# Patient Record
Sex: FEMALE | Race: WHITE | Employment: STUDENT | URBAN - METROPOLITAN AREA
[De-identification: names, ages, dates, MRNs, and addresses within clinical notes are randomized per-mention and may not be internally consistent; named-entity substitution may affect disease eponyms.]

---
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
## 2021-03-03 DIAGNOSIS — R42 DIZZINESS: ICD-10-CM

## 2021-03-03 DIAGNOSIS — R42 DIZZINESS: Primary | ICD-10-CM

## 2021-03-03 LAB
BASOPHILS # BLD AUTO: 0 10E9/L (ref 0–0.2)
BASOPHILS NFR BLD AUTO: 0.7 %
DIFFERENTIAL METHOD BLD: NORMAL
EOSINOPHIL # BLD AUTO: 0.1 10E9/L (ref 0–0.7)
EOSINOPHIL NFR BLD AUTO: 1.8 %
ERYTHROCYTE [DISTWIDTH] IN BLOOD BY AUTOMATED COUNT: 12.9 % (ref 10–15)
FERRITIN SERPL-MCNC: 8 NG/ML (ref 12–150)
HCT VFR BLD AUTO: 41.1 % (ref 35–47)
HGB BLD-MCNC: 13.3 G/DL (ref 11.7–15.7)
IMM GRANULOCYTES # BLD: 0 10E9/L (ref 0–0.4)
IMM GRANULOCYTES NFR BLD: 0.4 %
LYMPHOCYTES # BLD AUTO: 2.8 10E9/L (ref 0.8–5.3)
LYMPHOCYTES NFR BLD AUTO: 49.9 %
MCH RBC QN AUTO: 30.9 PG (ref 26.5–33)
MCHC RBC AUTO-ENTMCNC: 32.4 G/DL (ref 31.5–36.5)
MCV RBC AUTO: 96 FL (ref 78–100)
MONOCYTES # BLD AUTO: 0.4 10E9/L (ref 0–1.3)
MONOCYTES NFR BLD AUTO: 7.2 %
NEUTROPHILS # BLD AUTO: 2.3 10E9/L (ref 1.6–8.3)
NEUTROPHILS NFR BLD AUTO: 40 %
NRBC # BLD AUTO: 0 10*3/UL
NRBC BLD AUTO-RTO: 0 /100
PLATELET # BLD AUTO: 257 10E9/L (ref 150–450)
RBC # BLD AUTO: 4.3 10E12/L (ref 3.8–5.2)
WBC # BLD AUTO: 5.7 10E9/L (ref 4–11)

## 2021-03-03 PROCEDURE — 36415 COLL VENOUS BLD VENIPUNCTURE: CPT | Performed by: PATHOLOGY

## 2021-03-03 PROCEDURE — 82728 ASSAY OF FERRITIN: CPT | Performed by: PATHOLOGY

## 2021-03-03 PROCEDURE — 85025 COMPLETE CBC W/AUTO DIFF WBC: CPT | Performed by: PATHOLOGY

## 2021-03-04 ENCOUNTER — OFFICE VISIT (OUTPATIENT)
Dept: FAMILY MEDICINE | Facility: CLINIC | Age: 21
End: 2021-03-04
Payer: COMMERCIAL

## 2021-03-04 VITALS — HEART RATE: 67 BPM | DIASTOLIC BLOOD PRESSURE: 79 MMHG | SYSTOLIC BLOOD PRESSURE: 114 MMHG

## 2021-03-04 DIAGNOSIS — R55 PRE-SYNCOPE: Primary | ICD-10-CM

## 2021-03-04 NOTE — LETTER
"  3/4/2021      RE: Leland Gonzáles  [address] [facility] [address]       [facility]            SUBJECTIVE:     Leland Gonzáles is a 20 year old female  presenting to clinic today with concerns for dizziness. I was alerted by the headt swimming ATC, Dyan Stout of the patients concerns yesterday. Historically, th patient and her family have thought that she has low iron levels and that could be contributing. We ordered a cbc with platelets prior to the exam, and this showed her ferritin levels to be 8 (normal >12). She was not enmic however and the results are be,ow.     Today, Leland states that she has been feeling like this for about a year. No family history of this in others. Feels as though the \"dizzy\" episodes have been increasing in frequency over the past 3 months. \"Dizzy\" described as feeling as though she wants to faint, but she has not lost consciousness. Happened twice over the past week (once while brushing teeth and the other while at practice, about to enter pool). She eats adequate calories and adequate fluid volume (4493-9363 calories and > 3L of water). She does admit to palpitations at times. She also feels tired and weak. Mild stomach pains for the past year as well.    No headache, n/v, fevers, runny/stuffy nose, sore throat, chest pain, shortness of breath, numbness/tingling, or changes in bowel habits. No skin changes, hair loss, or abnormal weight gain/loss.     PMH, Medications and Allergies were reviewed and updated as needed.    ROS:  As noted above otherwise negative.    There is no problem list on file for this patient.      Current Outpatient Medications   Medication Sig Dispense Refill     desogestrel-ethinyl estradiol (APRI) 0.15-30 MG-MCG tablet Take 1 tablet by mouth daily                OBJECTIVE:     Vitals:   Vitals:    03/04/21 1002 03/04/21 1003 03/04/21 1007   BP: 124/65 111/63 114/79   Pulse: 78 67      BMI: There is " no height or weight on file to calculate BMI.    Gen:  Well nourished and in no acute distress  HEENT: Extraocular movement intact.  Neck: supple  CV: RRR. No murmurs, rubs, or gallops  Resp: Lungs CTA. NO evidence of consolidation, wheezing, rales, or crackles.   Skin: No rash, erythema, ecchymosis or obvious abnormality  Psych: Euthymic   Neuro: Alert and oriented.              ASSESSMENT & PLAN:      Leland was seen today for fatigue.    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 She had covid in September of 2020 so a cardiac workup at that time was wnl. She does not seem to be having orthostatic hypotension as this was checked today and within normal limits for blood pressure and heart rate. Possibility that this could be vasovagal but there is no clear nidus from her history. Further workup is recommended at this point. I do not think she needs a stress ECHO as her symptoms appear to be happening at rest and her previous ECHO from 2020 was unremarkable. Her recent ferritin was low at 8, but she did just finish her menstrual cycle yesterday (same time as the lab) and her hgb was within normal limits. There is a potential that iron deficiency without anemia could be a cause of her fatigue but may not explain all of her symptoms.     -Zio patch to assess arrhythmia  -Iron studies. May need to supplement iron depending on further workup  -vitamin D level. May need to supplement pending level. Could explain tiredness during this particular season  -CMP and TSH  -If overall workup negative, should consider tilt table testing as a next step  -Should not ensue in intense activity or swimming until testing has  resulted and further discussion with us in regards to direction of management  -Her competition season is effectively finished so this should not be an issue    JC De Jesus in attendance  Swim JC Stout updated over the phone      Options for treatment and/or follow-up care were reviewed with the patient. Patient was actively involved in the decision making process. Patient verbalized understanding and was in agreement with the plan.    The patient was seen by and discussed with Dr.Suzanne TAVON Fong MD, CAQ, CCD    Rell Smith DO  Primary Care Sports Medicine Fellow      Attending Note:   I have discussed this patient and have reviewed the clinical presentation and progress note with the fellow. I agree with the treatment plan as outlined. The plan was formulated with the fellow on the day of the patient's visit.     Latonya Fong MD, CAQ, CCD  [facility]  Sports Medicine and Bone Health      Rell Smith DO

## 2021-03-04 NOTE — PROGRESS NOTES
"[facility]            SUBJECTIVE:     Leland Gonzáles is a 20 year old female  presenting to clinic today with concerns for dizziness. I was alerted by the headt swimming ATC, Dyan Stout of the patients concerns yesterday. Historically, th patient and her family have thought that she has low iron levels and that could be contributing. We ordered a cbc with platelets prior to the exam, and this showed her ferritin levels to be 8 (normal >12). She was not enmic however and the results are be,ow.     Today, Leland states that she has been feeling like this for about a year. No family history of this in others. Feels as though the \"dizzy\" episodes have been increasing in frequency over the past 3 months. \"Dizzy\" described as feeling as though she wants to faint, but she has not lost consciousness. Happened twice over the past week (once while brushing teeth and the other while at practice, about to enter pool). She eats adequate calories and adequate fluid volume (3601-4643 calories and > 3L of water). She does admit to palpitations at times. She also feels tired and weak. Mild stomach pains for the past year as well.    No headache, n/v, fevers, runny/stuffy nose, sore throat, chest pain, shortness of breath, numbness/tingling, or changes in bowel habits. No skin changes, hair loss, or abnormal weight gain/loss.     PMH, Medications and Allergies were reviewed and updated as needed.    ROS:  As noted above otherwise negative.    There is no problem list on file for this patient.      Current Outpatient Medications   Medication Sig Dispense Refill     desogestrel-ethinyl estradiol (APRI) 0.15-30 MG-MCG tablet Take 1 tablet by mouth daily                OBJECTIVE:     Vitals:   Vitals:    03/04/21 1002 03/04/21 1003 03/04/21 1007   BP: 124/65 111/63 114/79   Pulse: 78 67      BMI: There is no height or weight on file to calculate BMI.    Gen:  Well nourished and in no " acute distress  HEENT: Extraocular movement intact.  Neck: supple  CV: RRR. No murmurs, rubs, or gallops  Resp: Lungs CTA. NO evidence of consolidation, wheezing, rales, or crackles.   Skin: No rash, erythema, ecchymosis or obvious abnormality  Psych: Euthymic   Neuro: Alert and oriented.              ASSESSMENT & PLAN:      Leland was seen today for fatigue.    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 She had covid in September of 2020 so a cardiac workup at that time was wnl. She does not seem to be having orthostatic hypotension as this was checked today and within normal limits for blood pressure and heart rate. Possibility that this could be vasovagal but there is no clear nidus from her history. Further workup is recommended at this point. I do not think she needs a stress ECHO as her symptoms appear to be happening at rest and her previous ECHO from 2020 was unremarkable. Her recent ferritin was low at 8, but she did just finish her menstrual cycle yesterday (same time as the lab) and her hgb was within normal limits. There is a potential that iron deficiency without anemia could be a cause of her fatigue but may not explain all of her symptoms.     -Zio patch to assess arrhythmia  -Iron studies. May need to supplement iron depending on further workup  -vitamin D level. May need to supplement pending level. Could explain tiredness during this particular season  -CMP and TSH  -If overall workup negative, should consider tilt table testing as a next step  -Should not ensue in intense activity or swimming until testing has resulted and further discussion with us in regards to direction of management  -Her  competition season is effectively finished so this should not be an issue    ATC Emilia De Jesus in attendance  Swim ATC Emilia Stout updated over the phone      Options for treatment and/or follow-up care were reviewed with the patient. Patient was actively involved in the decision making process. Patient verbalized understanding and was in agreement with the plan.    The patient was seen by and discussed with Dr.Suzanne TAVON Fong MD, CAQ, CCD    Rell Smith DO  Primary Care Sports Medicine Fellow

## 2021-03-05 ENCOUNTER — ANCILLARY PROCEDURE (OUTPATIENT)
Dept: CARDIOLOGY | Facility: CLINIC | Age: 21
End: 2021-03-05
Attending: STUDENT IN AN ORGANIZED HEALTH CARE EDUCATION/TRAINING PROGRAM
Payer: COMMERCIAL

## 2021-03-05 DIAGNOSIS — R55 PRE-SYNCOPE: ICD-10-CM

## 2021-03-05 LAB
ALBUMIN SERPL-MCNC: 3.4 G/DL (ref 3.4–5)
ALP SERPL-CCNC: 60 U/L (ref 40–150)
ALT SERPL W P-5'-P-CCNC: 24 U/L (ref 0–50)
ANION GAP SERPL CALCULATED.3IONS-SCNC: 8 MMOL/L (ref 3–14)
AST SERPL W P-5'-P-CCNC: 18 U/L (ref 0–35)
BILIRUB SERPL-MCNC: 0.3 MG/DL (ref 0.2–1.3)
BUN SERPL-MCNC: 19 MG/DL (ref 7–30)
CALCIUM SERPL-MCNC: 9.3 MG/DL (ref 8.5–10.1)
CHLORIDE SERPL-SCNC: 104 MMOL/L (ref 96–110)
CO2 SERPL-SCNC: 28 MMOL/L (ref 20–32)
CREAT SERPL-MCNC: 0.77 MG/DL (ref 0.5–1)
DEPRECATED CALCIDIOL+CALCIFEROL SERPL-MC: 51 UG/L (ref 20–75)
GFR SERPL CREATININE-BSD FRML MDRD: >90 ML/MIN/{1.73_M2}
GLUCOSE SERPL-MCNC: 87 MG/DL (ref 70–99)
POTASSIUM SERPL-SCNC: 4.2 MMOL/L (ref 3.4–5.3)
PROT SERPL-MCNC: 7.5 G/DL (ref 6.8–8.8)
SODIUM SERPL-SCNC: 139 MMOL/L (ref 133–144)
TSH SERPL DL<=0.005 MIU/L-ACNC: 1.1 MU/L (ref 0.4–4)

## 2021-03-05 PROCEDURE — 80053 COMPREHEN METABOLIC PANEL: CPT | Performed by: PATHOLOGY

## 2021-03-05 PROCEDURE — 99000 SPECIMEN HANDLING OFFICE-LAB: CPT | Performed by: PATHOLOGY

## 2021-03-05 PROCEDURE — 82306 VITAMIN D 25 HYDROXY: CPT | Mod: 90 | Performed by: PATHOLOGY

## 2021-03-05 PROCEDURE — 84443 ASSAY THYROID STIM HORMONE: CPT | Performed by: PATHOLOGY

## 2021-03-05 PROCEDURE — 93227 XTRNL ECG REC<48 HR R&I: CPT | Performed by: INTERNAL MEDICINE

## 2021-03-05 PROCEDURE — 93225 XTRNL ECG REC<48 HRS REC: CPT

## 2021-03-05 PROCEDURE — 36415 COLL VENOUS BLD VENIPUNCTURE: CPT | Performed by: PATHOLOGY

## 2021-03-05 NOTE — PROGRESS NOTES
Attending Note:   I have discussed this patient and have reviewed the clinical presentation and progress note with the fellow. I agree with the treatment plan as outlined. The plan was formulated with the fellow on the day of the patient's visit.     Latonya Fong MD, CAQ, CCD  [facility]  Sports Medicine and Bone Health

## 2021-03-05 NOTE — PROGRESS NOTES
Per Dr. Rell Smith, patient to have 24 hour monitor placed.  Diagnosis: pre-syncope (R55)  Monitor placed: Yes  Patient Instructed: Yes  Patient verbalized understanding: Yes  Holter # [id number]  Placed by Hazel Davila MA

## 2021-03-08 LAB
ERYTHROCYTE [DISTWIDTH] IN BLOOD BY AUTOMATED COUNT: NORMAL % (ref 10–15)
HCT VFR BLD AUTO: NORMAL % (ref 35–47)
HGB BLD-MCNC: NORMAL G/DL (ref 11.7–15.7)
IRON STUDY JIC TUBE: NORMAL
MCH RBC QN AUTO: NORMAL PG (ref 26.5–33)
MCHC RBC AUTO-ENTMCNC: NORMAL G/DL (ref 31.5–36.5)
MCV RBC AUTO: NORMAL FL (ref 78–100)
PLATELET # BLD AUTO: NORMAL 10E9/L (ref 150–450)
RBC # BLD AUTO: NORMAL 10E12/L (ref 3.8–5.2)
WBC # BLD AUTO: NORMAL 10E9/L (ref 4–11)

## 2021-03-15 ENCOUNTER — OFFICE VISIT (OUTPATIENT)
Dept: FAMILY MEDICINE | Facility: CLINIC | Age: 21
End: 2021-03-15
Payer: COMMERCIAL

## 2021-03-15 VITALS
HEART RATE: 78 BPM | BODY MASS INDEX: 21.74 KG/M2 | SYSTOLIC BLOOD PRESSURE: 122 MMHG | WEIGHT: 146.8 LBS | HEIGHT: 69 IN | DIASTOLIC BLOOD PRESSURE: 77 MMHG

## 2021-03-15 DIAGNOSIS — R53.83 OTHER FATIGUE: ICD-10-CM

## 2021-03-15 DIAGNOSIS — R79.0 LOW FERRITIN: Primary | ICD-10-CM

## 2021-03-15 DIAGNOSIS — R42 DIZZINESS: ICD-10-CM

## 2021-03-15 DIAGNOSIS — Z30.09 GENERAL COUNSELING AND ADVICE ON FEMALE CONTRACEPTION: ICD-10-CM

## 2021-03-15 ASSESSMENT — MIFFLIN-ST. JEOR: SCORE: 1505.26

## 2021-03-15 NOTE — LETTER
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  See 's notes for details.      -Zio patch 24 hour turned in.  Had symptoms which are typical for her of lightheadedness with position changes.    -OCP for menstrual cramping but still feels tired and weak during menses thru entire time of withdrawal bleeds every month.  Eats spinach.  No red meat.  Eats fish and chicken.  -Never taken a iron supplement  -No further presyncopal epiisodes  -Doesn't like to eat before swim practice due to bothering her stomach  -Menses on this OCP have similar flow to off of OCPs.    -Her father who is a medic thinks her symptoms could be from low iron.   -She doesn't limit salt. Eats salty foods as well and adds slat to some of her foods.     Current Outpatient Medications   Medication     desogestrel-ethinyl estradiol (APRI) 0.15-30 MG-MCG tablet     No current facility-administered medications for this visit.          O: NAD  /77   Pulse 78   Ht 1.753 m (5' 9\")   Wt 66.6 kg (146 lb 12.8 oz)   LMP 03/01/2021 (Approximate)   BMI 21.68 kg/m          Results for LELAND AMAYA (MRN [id number]) as of 3/15/2021 10:34   Ref. Range 3/3/2021 16:44 3/4/2021 10:30 3/5/2021 14:59 3/5/2021 16:04   Sodium Latest Ref Range: 133 - 144 mmol/L   139    Potassium Latest Ref Range: 3.4 - 5.3 mmol/L   4.2    Chloride Latest Ref Range: 96 - 110 mmol/L   104    Carbon Dioxide Latest Ref Range: 20 - 32 mmol/L   28    Urea Nitrogen Latest Ref Range: 7 - 30 mg/dL   19    Creatinine Latest Ref Range: 0.50 - 1.00 mg/dL   0.77    GFR Estimate Latest Ref Range: >60 mL/min/1.73_m2   >90    GFR Estimate If Black Latest Ref Range: >60 mL/min/1.73_m2   >90    Calcium Latest Ref Range: 8.5 - 10.1 mg/dL   9.3    Anion Gap Latest Ref Range: 3 - 14 mmol/L   8    Albumin Latest Ref Range: 3.4 - 5.0 g/dL   3.4    Protein " Total Latest Ref Range: 6.8 - 8.8 g/dL   7.5    Bilirubin Total Latest Ref Range: 0.2 - 1.3 mg/dL   0.3    Alkaline Phosphatase Latest Ref Range: 40 - 150 U/L   60    ALT Latest Ref Range: 0 - 50 U/L   24    AST Latest Ref Range: 0 - 35 U/L   18    Ferritin Latest Ref Range: 12 - 150 ng/mL 8 (L)      TSH Latest Ref Range: 0.40 - 4.00 mU/L   1.10    Vitamin D Deficiency screening Latest Ref Range: 20 - 75 ug/L   51    Glucose Latest Ref Range: 70 - 99 mg/dL   87    WBC Latest Ref Range: 4.0 - 11.0 10e9/L 5.7 Canceled, Test credited     Hemoglobin Latest Ref Range: 11.7 - 15.7 g/dL 13.3 Canceled, Test credited     Hematocrit Latest Ref Range: 35.0 - 47.0 % 41.1 Canceled, Test credited     Platelet Count Latest Ref Range: 150 - 450 10e9/L 257 Canceled, Test credited     RBC Count Latest Ref Range: 3.8 - 5.2 10e12/L 4.30 Canceled, Test credited     MCV Latest Ref Range: 78 - 100 fl 96 Canceled, Test credited     MCH Latest Ref Range: 26.5 - 33.0 pg 30.9 Canceled, Test credited     MCHC Latest Ref Range: 31.5 - 36.5 g/dL 32.4 Canceled, Test credited     RDW Latest Ref Range: 10.0 - 15.0 % 12.9 Canceled, Test credited     Diff Method Unknown Automated Method      % Neutrophils Latest Units: % 40.0      % Lymphocytes Latest Units: % 49.9      % Monocytes Latest Units: % 7.2      % Eosinophils Latest Units: % 1.8      % Basophils Latest Units: % 0.7      % Immature Granulocytes Latest Units: % 0.4      Nucleated RBCs Latest Ref Range: 0 /100 0      Absolute Neutrophil Latest Ref Range: 1.6 - 8.3 10e9/L 2.3      Absolute Lymphocytes Latest Ref Range: 0.8 - 5.3 10e9/L 2.8      Absolute Monocytes Latest Ref Range: 0.0 - 1.3 10e9/L 0.4      Absolute Eosinophils Latest Ref Range: 0.0 - 0.7 10e9/L 0.1      Absolute Basophils Latest Ref Range: 0.0 - 0.2 10e9/L 0.0      Abs Immature Granulocytes Latest Ref Range: 0 - 0.4 10e9/L 0.0      Absolute Nucleated RBC Unknown 0.0        Zio patch results pending.     Echo and EKG done in  the Fall due to COVID RTP were normal.         A:    Chronic dizziness with position changes in a young athletic female with only one presyncopal episode last week not during exercise.   Low ferritin without anemia  Fatigue during withdrawal bleeds on OCP as well as off OCPs.    P:  I have explained that her ferritin is low and not she is not anemic.  If the iron storage continues to decrease she won't be able to make RBC appropriately and she will become anemia and that can definitely effect her performance and may make her dizzy.  I doublt that a ferritin of 8 with a normal hemoglobin would cause that symptom of positional change associated dizziness and that is likely due a responsive vascular system coupled with low intravascular volume.      -She has tried to increase her green vegetables but her ferritin has remained low.   -She won't eat red meat  -Add Vitamin C food source with eating leafy green vegetables.  Start Slow FE one per day.  This will be given to her from [facility].  Discussed side effects.   -Talk with OB-Gyn Doctor about taking an OCP like Seasonique to have only 4 withdrawal bleeds per year vs 12.  This may limit her symptoms of fatigue during withdrawal bleeds as well as lessen iron loss.   -Discussed ways to increase volume of fluid.  She doesn't like plain water but drinks water with BCA and added electrolytes.  Encouraged use of Powerade or other similar product.  Meet with dietician to discuss options/ideas.      Recheck ferritin and hemoglobin in 3 months and have a f/u at that time.      40min of total time spent in one-on-one evalution and discussion with patient regarding nature of problem, course, prior treatments, and therapeutic options,> 50% of which was spent in counseling and coordination of care along with chart review and documentation.  :      Emilia Stout ATC was present for the entire appt/    Latonya Fong MD, CAQ, FACSM, CCD  [facility]  Sports Medicine  and Bone Health  Team Physician;  Athletics

## 2021-07-13 ENCOUNTER — OFFICE VISIT (OUTPATIENT)
Dept: FAMILY MEDICINE | Facility: CLINIC | Age: 21
End: 2021-07-13
Payer: COMMERCIAL

## 2021-07-13 VITALS
BODY MASS INDEX: 21.77 KG/M2 | HEIGHT: 69 IN | DIASTOLIC BLOOD PRESSURE: 73 MMHG | SYSTOLIC BLOOD PRESSURE: 110 MMHG | HEART RATE: 96 BPM | WEIGHT: 147 LBS

## 2021-07-13 DIAGNOSIS — R53.83 FATIGUE, UNSPECIFIED TYPE: Primary | ICD-10-CM

## 2021-07-13 ASSESSMENT — MIFFLIN-ST. JEOR: SCORE: 1501.17

## 2021-07-13 NOTE — PROGRESS NOTES
"Attending Note:   I have personally examined this patient and have reviewed the clinical presentation and progress note with the fellow. I agree with the treatment plan as outlined. The plan was formulated with the fellow on the day of the patient's visit.     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  See 's notes for details.      -Zio patch 24 hour turned in.  Had symptoms which are typical for her of lightheadedness with position changes.    -OCP for menstrual cramping but still feels tired and weak during menses thru entire time of withdrawal bleeds every month.  Eats spinach.  No red meat.  Eats fish and chicken.  -Never taken a iron supplement  -No further presyncopal epiisodes  -Doesn't like to eat before swim practice due to bothering her stomach  -Menses on this OCP have similar flow to off of OCPs.    -Her father who is a medic thinks her symptoms could be from low iron.   -She doesn't limit salt. Eats salty foods as well and adds slat to some of her foods.     Current Outpatient Medications   Medication     desogestrel-ethinyl estradiol (APRI) 0.15-30 MG-MCG tablet     ferrous sulfate dried 160 (50 Fe) MG tablet     No current facility-administered medications for this visit.         O: NAD  /73   Pulse 96   Ht 1.753 m (5' 9\")   Wt 66.7 kg (147 lb)   LMP 06/29/2021   BMI 21.71 kg/m          Results for WILBUR AMAYA JESU (MRN [id number]) as of 3/15/2021 10:34   Ref. Range 3/3/2021 16:44 3/4/2021 10:30 3/5/2021 14:59 3/5/2021 16:04   Sodium Latest Ref Range: 133 - 144 mmol/L   139    Potassium Latest Ref Range: 3.4 - 5.3 mmol/L   4.2    Chloride Latest Ref Range: 96 - 110 mmol/L   104    Carbon Dioxide Latest Ref Range: 20 - 32 mmol/L   28    Urea Nitrogen Latest Ref Range: 7 - 30 mg/dL   19    Creatinine Latest Ref Range: 0.50 - 1.00 " mg/dL   0.77    GFR Estimate Latest Ref Range: >60 mL/min/1.73_m2   >90    GFR Estimate If Black Latest Ref Range: >60 mL/min/1.73_m2   >90    Calcium Latest Ref Range: 8.5 - 10.1 mg/dL   9.3    Anion Gap Latest Ref Range: 3 - 14 mmol/L   8    Albumin Latest Ref Range: 3.4 - 5.0 g/dL   3.4    Protein Total Latest Ref Range: 6.8 - 8.8 g/dL   7.5    Bilirubin Total Latest Ref Range: 0.2 - 1.3 mg/dL   0.3    Alkaline Phosphatase Latest Ref Range: 40 - 150 U/L   60    ALT Latest Ref Range: 0 - 50 U/L   24    AST Latest Ref Range: 0 - 35 U/L   18    Ferritin Latest Ref Range: 12 - 150 ng/mL 8 (L)      TSH Latest Ref Range: 0.40 - 4.00 mU/L   1.10    Vitamin D Deficiency screening Latest Ref Range: 20 - 75 ug/L   51    Glucose Latest Ref Range: 70 - 99 mg/dL   87    WBC Latest Ref Range: 4.0 - 11.0 10e9/L 5.7 Canceled, Test credited     Hemoglobin Latest Ref Range: 11.7 - 15.7 g/dL 13.3 Canceled, Test credited     Hematocrit Latest Ref Range: 35.0 - 47.0 % 41.1 Canceled, Test credited     Platelet Count Latest Ref Range: 150 - 450 10e9/L 257 Canceled, Test credited     RBC Count Latest Ref Range: 3.8 - 5.2 10e12/L 4.30 Canceled, Test credited     MCV Latest Ref Range: 78 - 100 fl 96 Canceled, Test credited     MCH Latest Ref Range: 26.5 - 33.0 pg 30.9 Canceled, Test credited     MCHC Latest Ref Range: 31.5 - 36.5 g/dL 32.4 Canceled, Test credited     RDW Latest Ref Range: 10.0 - 15.0 % 12.9 Canceled, Test credited     Diff Method Unknown Automated Method      % Neutrophils Latest Units: % 40.0      % Lymphocytes Latest Units: % 49.9      % Monocytes Latest Units: % 7.2      % Eosinophils Latest Units: % 1.8      % Basophils Latest Units: % 0.7      % Immature Granulocytes Latest Units: % 0.4      Nucleated RBCs Latest Ref Range: 0 /100 0      Absolute Neutrophil Latest Ref Range: 1.6 - 8.3 10e9/L 2.3      Absolute Lymphocytes Latest Ref Range: 0.8 - 5.3 10e9/L 2.8      Absolute Monocytes Latest Ref Range: 0.0 - 1.3 10e9/L  0.4      Absolute Eosinophils Latest Ref Range: 0.0 - 0.7 10e9/L 0.1      Absolute Basophils Latest Ref Range: 0.0 - 0.2 10e9/L 0.0      Abs Immature Granulocytes Latest Ref Range: 0 - 0.4 10e9/L 0.0      Absolute Nucleated RBC Unknown 0.0        Zio patch results pending.     Echo and EKG done in the Fall due to COVID RTP were normal.         A:    Chronic dizziness with position changes in a young athletic female with only one presyncopal episode last week not during exercise.   Low ferritin without anemia  Fatigue during withdrawal bleeds on OCP as well as off OCPs.    P:  I have explained that her ferritin is low and not she is not anemic.  If the iron storage continues to decrease she won't be able to make RBC appropriately and she will become anemia and that can definitely effect her performance and may make her dizzy.  I doublt that a ferritin of 8 with a normal hemoglobin would cause that symptom of positional change associated dizziness and that is likely due a responsive vascular system coupled with low intravascular volume.      -She has tried to increase her green vegetables but her ferritin has remained low.   -She won't eat red meat  -Add Vitamin C food source with eating leafy green vegetables.  Start Slow FE one per day.  This will be given to her from [facility].  Discussed side effects.   -Talk with OB-Gyn Doctor about taking an OCP like Seasonique to have only 4 withdrawal bleeds per year vs 12.  This may limit her symptoms of fatigue during withdrawal bleeds as well as lessen iron loss.   -Discussed ways to increase volume of fluid.  She doesn't like plain water but drinks water with BCA and added electrolytes.  Encouraged use of Powerade or other similar product.  Meet with dietician to discuss options/ideas.      Recheck ferritin and hemoglobin in 3 months and have a f/u at that time.      40min of total time spent in one-on-one evalution and discussion with patient regarding nature of problem,  "course, prior treatments, and therapeutic options,> 50% of which was spent in counseling and coordination of care along with chart review and documentation.  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  See 's notes for details.      -Zio patch 24 hour turned in.  Had symptoms which are typical for her of lightheadedness with position changes.    -OCP for menstrual cramping but still feels tired and weak during menses thru entire time of withdrawal bleeds every month.  Eats spinach.  No red meat.  Eats fish and chicken.  -Never taken a iron supplement  -No further presyncopal epiisodes  -Doesn't like to eat before swim practice due to bothering her stomach  -Menses on this OCP have similar flow to off of OCPs.    -Her father who is a medic thinks her symptoms could be from low iron.   -She doesn't limit salt. Eats salty foods as well and adds slat to some of her foods.     Current Outpatient Medications   Medication     desogestrel-ethinyl estradiol (APRI) 0.15-30 MG-MCG tablet     ferrous sulfate dried 160 (50 Fe) MG tablet     No current facility-administered medications for this visit.         O: NAD  /73   Pulse 96   Ht 1.753 m (5' 9\")   Wt 66.7 kg (147 lb)   LMP 06/29/2021   BMI 21.71 kg/m          Results for WILBUR AMAYA JESU (MRN [id number]) as of 3/15/2021 10:34   Ref. Range 3/3/2021 16:44 3/4/2021 10:30 3/5/2021 14:59 3/5/2021 16:04   Sodium Latest Ref Range: 133 - 144 mmol/L   139    Potassium Latest Ref Range: 3.4 - 5.3 mmol/L   4.2    Chloride Latest Ref Range: 96 - 110 mmol/L   104    Carbon Dioxide Latest Ref Range: 20 - 32 mmol/L   28    Urea Nitrogen Latest Ref Range: 7 - 30 mg/dL   19    Creatinine Latest Ref Range: 0.50 - 1.00 " 0.4      Absolute Eosinophils Latest Ref Range: 0.0 - 0.7 10e9/L 0.1      Absolute Basophils Latest Ref Range: 0.0 - 0.2 10e9/L 0.0      Abs Immature Granulocytes Latest Ref Range: 0 - 0.4 10e9/L 0.0      Absolute Nucleated RBC Unknown 0.0        Zio patch results pending.     Echo and EKG done in the Fall due to COVID RTP were normal.         A:    Chronic dizziness with position changes in a young athletic female with only one presyncopal episode last week not during exercise.   Low ferritin without anemia  Fatigue during withdrawal bleeds on OCP as well as off OCPs.    P:  I have explained that her ferritin is low and not she is not anemic.  If the iron storage continues to decrease she won't be able to make RBC appropriately and she will become anemia and that can definitely effect her performance and may make her dizzy.  I doublt that a ferritin of 8 with a normal hemoglobin would cause that symptom of positional change associated dizziness and that is likely due a responsive vascular system coupled with low intravascular volume.      -She has tried to increase her green vegetables but her ferritin has remained low.   -She won't eat red meat  -Add Vitamin C food source with eating leafy green vegetables.  Start Slow FE one per day.  This will be given to her from [facility].  Discussed side effects.   -Talk with OB-Gyn Doctor about taking an OCP like Seasonique to have only 4 withdrawal bleeds per year vs 12.  This may limit her symptoms of fatigue during withdrawal bleeds as well as lessen iron loss.   -Discussed ways to increase volume of fluid.  She doesn't like plain water but drinks water with BCA and added electrolytes.  Encouraged use of Powerade or other similar product.  Meet with dietician to discuss options/ideas.      Recheck ferritin and hemoglobin in 3 months and have a f/u at that time.      40min of total time spent in one-on-one evalution and discussion with patient regarding nature of problem,  "course, prior treatments, and therapeutic options,> 50% of which was spent in counseling and coordination of care along with chart review and documentation.  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  See 's notes for details.      -Zio patch 24 hour turned in.  Had symptoms which are typical for her of lightheadedness with position changes.    -OCP for menstrual cramping but still feels tired and weak during menses thru entire time of withdrawal bleeds every month.  Eats spinach.  No red meat.  Eats fish and chicken.  -Never taken a iron supplement  -No further presyncopal epiisodes  -Doesn't like to eat before swim practice due to bothering her stomach  -Menses on this OCP have similar flow to off of OCPs.    -Her father who is a medic thinks her symptoms could be from low iron.   -She doesn't limit salt. Eats salty foods as well and adds slat to some of her foods.     Current Outpatient Medications   Medication     desogestrel-ethinyl estradiol (APRI) 0.15-30 MG-MCG tablet     ferrous sulfate dried 160 (50 Fe) MG tablet     No current facility-administered medications for this visit.         O: NAD  /73   Pulse 96   Ht 1.753 m (5' 9\")   Wt 66.7 kg (147 lb)   LMP 06/29/2021   BMI 21.71 kg/m          Results for WILBUR AMAYA JESU (MRN [id number]) as of 3/15/2021 10:34   Ref. Range 3/3/2021 16:44 3/4/2021 10:30 3/5/2021 14:59 3/5/2021 16:04   Sodium Latest Ref Range: 133 - 144 mmol/L   139    Potassium Latest Ref Range: 3.4 - 5.3 mmol/L   4.2    Chloride Latest Ref Range: 96 - 110 mmol/L   104    Carbon Dioxide Latest Ref Range: 20 - 32 mmol/L   28    Urea Nitrogen Latest Ref Range: 7 - 30 mg/dL   19    Creatinine Latest Ref Range: 0.50 - 1.00 " 0.4      Absolute Eosinophils Latest Ref Range: 0.0 - 0.7 10e9/L 0.1      Absolute Basophils Latest Ref Range: 0.0 - 0.2 10e9/L 0.0      Abs Immature Granulocytes Latest Ref Range: 0 - 0.4 10e9/L 0.0      Absolute Nucleated RBC Unknown 0.0        Zio patch results pending.     Echo and EKG done in the Fall due to COVID RTP were normal.         A:    Chronic dizziness with position changes in a young athletic female with only one presyncopal episode last week not during exercise.   Low ferritin without anemia  Fatigue during withdrawal bleeds on OCP as well as off OCPs.    P:  I have explained that her ferritin is low and not she is not anemic.  If the iron storage continues to decrease she won't be able to make RBC appropriately and she will become anemia and that can definitely effect her performance and may make her dizzy.  I doublt that a ferritin of 8 with a normal hemoglobin would cause that symptom of positional change associated dizziness and that is likely due a responsive vascular system coupled with low intravascular volume.      -She has tried to increase her green vegetables but her ferritin has remained low.   -She won't eat red meat  -Add Vitamin C food source with eating leafy green vegetables.  Start Slow FE one per day.  This will be given to her from [facility].  Discussed side effects.   -Talk with OB-Gyn Doctor about taking an OCP like Seasonique to have only 4 withdrawal bleeds per year vs 12.  This may limit her symptoms of fatigue during withdrawal bleeds as well as lessen iron loss.   -Discussed ways to increase volume of fluid.  She doesn't like plain water but drinks water with BCA and added electrolytes.  Encouraged use of Powerade or other similar product.  Meet with dietician to discuss options/ideas.      Recheck ferritin and hemoglobin in 3 months and have a f/u at that time.      40min of total time spent in one-on-one evalution and discussion with patient regarding nature of problem,  course, prior treatments, and therapeutic options,> 50% of which was spent in counseling and coordination of care along with chart review and documentation.  :      Emilia Stout ATC was present for the entire appt/    Latonya Fong MD, CAQ, FACSM, CCD  [facility]  Sports Medicine and Bone Health  Team Physician;  Athletics            -Improving dizziness with postural changes.  No further presyncopal episodes.   -Taking one iron pill per day.  No side effects except constipation early on.   -Drinks a bottle of water with BCA and salt (32 oz) with each practice.  -Takes 100% of the time her medications.          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  See 's notes for details.      -Zio patch 24 hour turned in.  Had symptoms which are typical for her of lightheadedness with position changes.    -OCP for menstrual cramping but still feels tired and weak during menses thru entire time of withdrawal bleeds every month.  Eats spinach.  No red meat.  Eats fish and chicken.  -Never taken a iron supplement  -No further presyncopal epiisodes  -Doesn't like to eat before swim practice due to bothering her stomach  -Menses on this OCP have similar flow to off of OCPs.    -Her father who is a medic thinks her symptoms could be from low iron.   -She doesn't limit salt. Eats salty foods as well and adds slat to some of her foods.     Current Outpatient Medications   Medication     desogestrel-ethinyl estradiol (APRI) 0.15-30 MG-MCG tablet     ferrous sulfate dried 160 (50 Fe) MG tablet     No current facility-administered medications for this visit.

## 2021-07-13 NOTE — LETTER
"  7/13/2021      RE: Leland Amaya  [address]  [place] [address]       Attending Note:   I have personally examined this patient and have reviewed the clinical presentation and progress note with the fellow. I agree with the treatment plan as outlined. The plan was formulated with the fellow on the day of the patient's visit.     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  See 's notes for details.      -Zio patch 24 hour turned in.  Had symptoms which are typical for her of lightheadedness with position changes.    -OCP for menstrual cramping but still feels tired and weak during menses thru entire time of withdrawal bleeds every month.  Eats spinach.  No red meat.  Eats fish and chicken.  -Never taken a iron supplement  -No further presyncopal epiisodes  -Doesn't like to eat before swim practice due to bothering her stomach  -Menses on this OCP have similar flow to off of OCPs.    -Her father who is a medic thinks her symptoms could be from low iron.   -She doesn't limit salt. Eats salty foods as well and adds slat to some of her foods.     Current Outpatient Medications   Medication     desogestrel-ethinyl estradiol (APRI) 0.15-30 MG-MCG tablet     ferrous sulfate dried 160 (50 Fe) MG tablet     No current facility-administered medications for this visit.         O: NAD  /73   Pulse 96   Ht 1.753 m (5' 9\")   Wt 66.7 kg (147 lb)   LMP 06/29/2021   BMI 21.71 kg/m          Results for LELAND AMAYA (MRN [id number]) as of 3/15/2021 10:34   Ref. Range 3/3/2021 16:44 3/4/2021 10:30 3/5/2021 14:59 3/5/2021 16:04   Sodium Latest Ref Range: 133 - 144 mmol/L   139    Potassium Latest Ref Range: 3.4 - 5.3 mmol/L   4.2    Chloride Latest Ref Range: 96 - 110 mmol/L   104    Carbon Dioxide Latest Ref Range: 20 - 32 mmol/L   28    Urea " Nitrogen Latest Ref Range: 7 - 30 mg/dL   19    Creatinine Latest Ref Range: 0.50 - 1.00 mg/dL   0.77    GFR Estimate Latest Ref Range: >60 mL/min/1.73_m2   >90    GFR Estimate If Black Latest Ref Range: >60 mL/min/1.73_m2   >90    Calcium Latest Ref Range: 8.5 - 10.1 mg/dL   9.3    Anion Gap Latest Ref Range: 3 - 14 mmol/L   8    Albumin Latest Ref Range: 3.4 - 5.0 g/dL   3.4    Protein Total Latest Ref Range: 6.8 - 8.8 g/dL   7.5    Bilirubin Total Latest Ref Range: 0.2 - 1.3 mg/dL   0.3    Alkaline Phosphatase Latest Ref Range: 40 - 150 U/L   60    ALT Latest Ref Range: 0 - 50 U/L   24    AST Latest Ref Range: 0 - 35 U/L   18    Ferritin Latest Ref Range: 12 - 150 ng/mL 8 (L)      TSH Latest Ref Range: 0.40 - 4.00 mU/L   1.10    Vitamin D Deficiency screening Latest Ref Range: 20 - 75 ug/L   51    Glucose Latest Ref Range: 70 - 99 mg/dL   87    WBC Latest Ref Range: 4.0 - 11.0 10e9/L 5.7 Canceled, Test credited     Hemoglobin Latest Ref Range: 11.7 - 15.7 g/dL 13.3 Canceled, Test credited     Hematocrit Latest Ref Range: 35.0 - 47.0 % 41.1 Canceled, Test credited     Platelet Count Latest Ref Range: 150 - 450 10e9/L 257 Canceled, Test credited     RBC Count Latest Ref Range: 3.8 - 5.2 10e12/L 4.30 Canceled, Test credited     MCV Latest Ref Range: 78 - 100 fl 96 Canceled, Test credited     MCH Latest Ref Range: 26.5 - 33.0 pg 30.9 Canceled, Test credited     MCHC Latest Ref Range: 31.5 - 36.5 g/dL 32.4 Canceled, Test credited     RDW Latest Ref Range: 10.0 - 15.0 % 12.9 Canceled, Test credited     Diff Method Unknown Automated Method      % Neutrophils Latest Units: % 40.0      % Lymphocytes Latest Units: % 49.9      % Monocytes Latest Units: % 7.2      % Eosinophils Latest Units: % 1.8      % Basophils Latest Units: % 0.7      % Immature Granulocytes Latest Units: % 0.4      Nucleated RBCs Latest Ref Range: 0 /100 0      Absolute Neutrophil Latest Ref Range: 1.6 - 8.3 10e9/L 2.3      Absolute Lymphocytes Latest  Ref Range: 0.8 - 5.3 10e9/L 2.8      Absolute Monocytes Latest Ref Range: 0.0 - 1.3 10e9/L 0.4      Absolute Eosinophils Latest Ref Range: 0.0 - 0.7 10e9/L 0.1      Absolute Basophils Latest Ref Range: 0.0 - 0.2 10e9/L 0.0      Abs Immature Granulocytes Latest Ref Range: 0 - 0.4 10e9/L 0.0      Absolute Nucleated RBC Unknown 0.0        Zio patch results pending.     Echo and EKG done in the Fall due to COVID RTP were normal.         A:    Chronic dizziness with position changes in a young athletic female with only one presyncopal episode last week not during exercise.   Low ferritin without anemia  Fatigue during withdrawal bleeds on OCP as well as off OCPs.    P:  I have explained that her ferritin is low and not she is not anemic.  If the iron storage continues to decrease she won't be able to make RBC appropriately and she will become anemia and that can definitely effect her performance and may make her dizzy.  I doublt that a ferritin of 8 with a normal hemoglobin would cause that symptom of positional change associated dizziness and that is likely due a responsive vascular system coupled with low intravascular volume.      -She has tried to increase her green vegetables but her ferritin has remained low.   -She won't eat red meat  -Add Vitamin C food source with eating leafy green vegetables.  Start Slow FE one per day.  This will be given to her from [facility].  Discussed side effects.   -Talk with OB-Gyn Doctor about taking an OCP like Seasonique to have only 4 withdrawal bleeds per year vs 12.  This may limit her symptoms of fatigue during withdrawal bleeds as well as lessen iron loss.   -Discussed ways to increase volume of fluid.  She doesn't like plain water but drinks water with BCA and added electrolytes.  Encouraged use of Powerade or other similar product.  Meet with dietician to discuss options/ideas.      Recheck ferritin and hemoglobin in 3 months and have a f/u at that time.      40min of total  "time spent in one-on-one evalution and discussion with patient regarding nature of problem, course, prior treatments, and therapeutic options,> 50% of which was spent in counseling and coordination of care along with chart review and documentation.  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  See 's notes for details.      -Zio patch 24 hour turned in.  Had symptoms which are typical for her of lightheadedness with position changes.    -OCP for menstrual cramping but still feels tired and weak during menses thru entire time of withdrawal bleeds every month.  Eats spinach.  No red meat.  Eats fish and chicken.  -Never taken a iron supplement  -No further presyncopal epiisodes  -Doesn't like to eat before swim practice due to bothering her stomach  -Menses on this OCP have similar flow to off of OCPs.    -Her father who is a medic thinks her symptoms could be from low iron.   -She doesn't limit salt. Eats salty foods as well and adds slat to some of her foods.     Current Outpatient Medications   Medication     desogestrel-ethinyl estradiol (APRI) 0.15-30 MG-MCG tablet     ferrous sulfate dried 160 (50 Fe) MG tablet     No current facility-administered medications for this visit.         O: NAD  /73   Pulse 96   Ht 1.753 m (5' 9\")   Wt 66.7 kg (147 lb)   LMP 06/29/2021   BMI 21.71 kg/m          Results for WILBUR AMAYA JESU (MRN [id number]) as of 3/15/2021 10:34   Ref. Range 3/3/2021 16:44 3/4/2021 10:30 3/5/2021 14:59 3/5/2021 16:04   Sodium Latest Ref Range: 133 - 144 mmol/L   139    Potassium Latest Ref Range: 3.4 - 5.3 mmol/L   4.2    Chloride Latest Ref Range: 96 - 110 mmol/L   104    Carbon Dioxide Latest Ref Range: 20 - 32 mmol/L   28    Urea " Ref Range: 0.8 - 5.3 10e9/L 2.8      Absolute Monocytes Latest Ref Range: 0.0 - 1.3 10e9/L 0.4      Absolute Eosinophils Latest Ref Range: 0.0 - 0.7 10e9/L 0.1      Absolute Basophils Latest Ref Range: 0.0 - 0.2 10e9/L 0.0      Abs Immature Granulocytes Latest Ref Range: 0 - 0.4 10e9/L 0.0      Absolute Nucleated RBC Unknown 0.0        Zio patch results pending.     Echo and EKG done in the Fall due to COVID RTP were normal.         A:    Chronic dizziness with position changes in a young athletic female with only one presyncopal episode last week not during exercise.   Low ferritin without anemia  Fatigue during withdrawal bleeds on OCP as well as off OCPs.    P:  I have explained that her ferritin is low and not she is not anemic.  If the iron storage continues to decrease she won't be able to make RBC appropriately and she will become anemia and that can definitely effect her performance and may make her dizzy.  I doublt that a ferritin of 8 with a normal hemoglobin would cause that symptom of positional change associated dizziness and that is likely due a responsive vascular system coupled with low intravascular volume.      -She has tried to increase her green vegetables but her ferritin has remained low.   -She won't eat red meat  -Add Vitamin C food source with eating leafy green vegetables.  Start Slow FE one per day.  This will be [facility] to her from [facility].  Discussed side effects.   -Talk with OB-Gyn Doctor about taking an OCP like Seasonique to have only 4 withdrawal bleeds per year vs 12.  This may limit her symptoms of fatigue during withdrawal bleeds as well as lessen iron loss.   -Discussed ways to increase volume of fluid.  She doesn't like plain water but drinks water with BCA and added electrolytes.  Encouraged use of Powerade or other similar product.  Meet with dietician to discuss options/ideas.      Recheck ferritin and hemoglobin in 3 months and have a f/u at that time.      40min of total  "time spent in one-on-one evalution and discussion with patient regarding nature of problem, course, prior treatments, and therapeutic options,> 50% of which was spent in counseling and coordination of care along with chart review and documentation.  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  See 's notes for details.      -Zio patch 24 hour turned in.  Had symptoms which are typical for her of lightheadedness with position changes.    -OCP for menstrual cramping but still feels tired and weak during menses thru entire time of withdrawal bleeds every month.  Eats spinach.  No red meat.  Eats fish and chicken.  -Never taken a iron supplement  -No further presyncopal epiisodes  -Doesn't like to eat before swim practice due to bothering her stomach  -Menses on this OCP have similar flow to off of OCPs.    -Her father who is a medic thinks her symptoms could be from low iron.   -She doesn't limit salt. Eats salty foods as well and adds slat to some of her foods.     Current Outpatient Medications   Medication     desogestrel-ethinyl estradiol (APRI) 0.15-30 MG-MCG tablet     ferrous sulfate dried 160 (50 Fe) MG tablet     No current facility-administered medications for this visit.         O: NAD  /73   Pulse 96   Ht 1.753 m (5' 9\")   Wt 66.7 kg (147 lb)   LMP 06/29/2021   BMI 21.71 kg/m          Results for WILBUR AMAYA JESU (MRN [id number]) as of 3/15/2021 10:34   Ref. Range 3/3/2021 16:44 3/4/2021 10:30 3/5/2021 14:59 3/5/2021 16:04   Sodium Latest Ref Range: 133 - 144 mmol/L   139    Potassium Latest Ref Range: 3.4 - 5.3 mmol/L   4.2    Chloride Latest Ref Range: 96 - 110 mmol/L   104    Carbon Dioxide Latest Ref Range: 20 - 32 mmol/L   28    Urea " Ref Range: 0.8 - 5.3 10e9/L 2.8      Absolute Monocytes Latest Ref Range: 0.0 - 1.3 10e9/L 0.4      Absolute Eosinophils Latest Ref Range: 0.0 - 0.7 10e9/L 0.1      Absolute Basophils Latest Ref Range: 0.0 - 0.2 10e9/L 0.0      Abs Immature Granulocytes Latest Ref Range: 0 - 0.4 10e9/L 0.0      Absolute Nucleated RBC Unknown 0.0        Zio patch results pending.     Echo and EKG done in the Fall due to COVID RTP were normal.         A:    Chronic dizziness with position changes in a young athletic female with only one presyncopal episode last week not during exercise.   Low ferritin without anemia  Fatigue during withdrawal bleeds on OCP as well as off OCPs.    P:  I have explained that her ferritin is low and not she is not anemic.  If the iron storage continues to decrease she won't be able to make RBC appropriately and she will become anemia and that can definitely effect her performance and may make her dizzy.  I doublt that a ferritin of 8 with a normal hemoglobin would cause that symptom of positional change associated dizziness and that is likely due a responsive vascular system coupled with low intravascular volume.      -She has tried to increase her green vegetables but her ferritin has remained low.   -She won't eat red meat  -Add Vitamin C food source with eating leafy green vegetables.  Start Slow FE one per day.  This will be given to her from [facility].  Discussed side effects.   -Talk with OB-Gyn Doctor about taking an OCP like Seasonique to have only 4 withdrawal bleeds per year vs 12.  This may limit her symptoms of fatigue during withdrawal bleeds as well as lessen iron loss.   -Discussed ways to increase volume of fluid.  She doesn't like plain water but drinks water with BCA and added electrolytes.  Encouraged use of Powerade or other similar product.  Meet with dietician to discuss options/ideas.      Recheck ferritin and hemoglobin in 3 months and have a f/u at that time.      40min of total  time spent in one-on-one evalution and discussion with patient regarding nature of problem, course, prior treatments, and therapeutic options,> 50% of which was spent in counseling and coordination of care along with chart review and documentation.  :      Emilia Stout ATC was present for the entire appt/    Latonya Fong MD, CAQ, FACSM, CCD  [facility]  Sports Medicine and Bone Health  Team Physician;  Athletics            -Improving dizziness with postural changes.  No further presyncopal episodes.   -Taking one iron pill per day.  No side effects except constipation early on.   -Drinks a bottle of water with BCA and salt (32 oz) with each practice.  -Takes 100% of the time her medications.          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  See 's notes for details.      -Zio patch 24 hour turned in.  Had symptoms which are typical for her of lightheadedness with position changes.    -OCP for menstrual cramping but still feels tired and weak during menses thru entire time of withdrawal bleeds every month.  Eats spinach.  No red meat.  Eats fish and chicken.  -Never taken a iron supplement  -No further presyncopal epiisodes  -Doesn't like to eat before swim practice due to bothering her stomach  -Menses on this OCP have similar flow to off of OCPs.    -Her father who is a medic thinks her symptoms could be from low iron.   -She doesn't limit salt. Eats salty foods as well and adds slat to some of her foods.     Current Outpatient Medications   Medication     desogestrel-ethinyl estradiol (APRI) 0.15-30 MG-MCG tablet     ferrous sulfate dried 160 (50 Fe) MG tablet     No current facility-administered medications for this visit.                       [facility]            SUBJECTIVE:     Leland Gonzáles is a 21 year old female  presenting to clinic today for follow up iron deficiency  "and presyncopal events.    Background: Patient previously evaluated by Dr. Rell Smith. Briefly, patient had significant work up for presyncopal episodes which was reassuring aside from a low ferritin. Dietary adjustments as well as iron supplementation was recommended at her last office visit.    Leland states that since starting the iron supplement she has felt much better. She states that her symptoms have become far less frequent and she only experiences occasional dizziness and no syncopal episodes. Patient is still taking her past OCP but has not experienced any significant change in her symptoms with menses. Leland denies chest pain, palpitations, shortness of breath or persistent fatigue. 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     PMH, Medications and Allergies were reviewed and updated as needed.    ROS:  As noted above otherwise negative.    Current Outpatient Medications   Medication Sig Dispense Refill     desogestrel-ethinyl estradiol (APRI) 0.15-30 MG-MCG tablet Take 1 tablet by mouth daily       ferrous sulfate dried 160 (50 Fe) MG tablet Take 1 tablet (160 mg) by mouth daily (with breakfast) 100 tablet 1              OBJECTIVE:     Vitals:   Vitals:    07/13/21 1125   BP: 110/73   Pulse: 96   Weight: 66.7 kg (147 lb)   Height: 1.753 m (5' 9\")     BMI: Body mass index is 21.71 kg/m .    Gen:  Well nourished and in no acute distress  HEENT: Extraocular movement intact.  Neck: supple  CV: RRR. No murmurs, rubs, or gallops  Resp: Lungs CTA. NO evidence of consolidation, wheezing, rales, or crackles.   Skin: No rash, erythema, ecchymosis or obvious abnormality  Psych: Euthymic   Neuro: Alert and oriented.            ASSESSMENT & PLAN:      Leland was seen today for fatigue.    Diagnoses and all orders for this visit:    Fatigue, unspecified type: " Fatigue and past syncopal events now improved with addition of iron supplementation. Patient was not anemic with last blood draw but did appear to have low iron stores which could have lead to her symptoms, especially during menses. Plan for patient to continue iron supplementation and repeat labs today. Patient may consider transitioning to alternative OCP if symptoms persist or worsen. Follow up prn.  -     ferrous sulfate dried 160 (50 Fe) MG tablet; Take 1 tablet (160 mg) by mouth daily (with breakfast)  -     Hemoglobin; Future  -     IRON; Future  -     IRON AND IRON BINDING CAPACITY; Future  -     FERRITIN; Future      Options for treatment and/or follow-up care were reviewed with the patient and , Emilia Stout. Patient was actively involved in the decision making process. Patient verbalized understanding and was in agreement with the plan.    The patient was seen by and discussed with Dr.Suzanne TAVON Fong MD, CAQ, CCD    Aidan Carlton DO PGY-4  [facility] Sports Medicine Fellow 2021-22        Attending Note:   I have personally examined this patient and have reviewed the clinical presentation and progress note with the fellow. I agree with the treatment plan as outlined. The plan was formulated with the fellow on the day of the patient's visit.     Latonya Fong MD, CAQ, CCD  [facility]  Sports Medicine and Bone Health      Latonya Fong MD

## 2021-07-17 NOTE — PROGRESS NOTES
"[facility]            SUBJECTIVE:     Leland Gonzáles is a 21 year old female  presenting to clinic today for follow up iron deficiency and presyncopal events.    Background: Patient previously evaluated by Dr. Rell Smith. Briefly, patient had significant work up for presyncopal episodes which was reassuring aside from a low ferritin. Dietary adjustments as well as iron supplementation was recommended at her last office visit.    Leland states that since starting the iron supplement she has felt much better. She states that her symptoms have become far less frequent and she only experiences occasional dizziness and no syncopal episodes. Patient is still taking her past OCP but has not experienced any significant change in her symptoms with menses. Leland denies chest pain, palpitations, shortness of breath or persistent fatigue. 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     PMH, Medications and Allergies were reviewed and updated as needed.    ROS:  As noted above otherwise negative.    Current Outpatient Medications   Medication Sig Dispense Refill     desogestrel-ethinyl estradiol (APRI) 0.15-30 MG-MCG tablet Take 1 tablet by mouth daily       ferrous sulfate dried 160 (50 Fe) MG tablet Take 1 tablet (160 mg) by mouth daily (with breakfast) 100 tablet 1              OBJECTIVE:     Vitals:   Vitals:    07/13/21 1125   BP: 110/73   Pulse: 96   Weight: 66.7 kg (147 lb)   Height: 1.753 m (5' 9\")     BMI: Body mass index is 21.71 kg/m .    Gen:  Well nourished and in no acute distress  HEENT: Extraocular movement intact.  Neck: supple  CV: RRR. No murmurs, rubs, or gallops  Resp: Lungs CTA. NO evidence of consolidation, wheezing, rales, or crackles.   Skin: No rash, erythema, ecchymosis or obvious abnormality  Psych: " Euthymic   Neuro: Alert and oriented.            ASSESSMENT & PLAN:      Leland was seen today for fatigue.    Diagnoses and all orders for this visit:    Fatigue, unspecified type: Fatigue and past syncopal events now improved with addition of iron supplementation. Patient was not anemic with last blood draw but did appear to have low iron stores which could have lead to her symptoms, especially during menses. Plan for patient to continue iron supplementation and repeat labs today. Patient may consider transitioning to alternative OCP if symptoms persist or worsen. Follow up prn.  -     ferrous sulfate dried 160 (50 Fe) MG tablet; Take 1 tablet (160 mg) by mouth daily (with breakfast)  -     Hemoglobin; Future  -     IRON; Future  -     IRON AND IRON BINDING CAPACITY; Future  -     FERRITIN; Future      Options for treatment and/or follow-up care were reviewed with the patient and , Emilia Stout. Patient was actively involved in the decision making process. Patient verbalized understanding and was in agreement with the plan.    The patient was seen by and discussed with Dr.Suzanne TAVON Fong MD, CAQ, CCD    Aidan Carlton DO PGY-4  [facility] Sports Medicine Fellow 2021-22

## 2021-07-19 NOTE — PROGRESS NOTES
Attending Note:   I have personally examined this patient and have reviewed the clinical presentation and progress note with the fellow. I agree with the treatment plan as outlined. The plan was formulated with the fellow on the day of the patient's visit.     Latonya Fong MD, CAQ, CCD  [facility]  Sports Medicine and Bone Health

## 2021-08-04 ENCOUNTER — LAB (OUTPATIENT)
Dept: LAB | Facility: CLINIC | Age: 21
End: 2021-08-04
Payer: COMMERCIAL

## 2021-08-04 DIAGNOSIS — R53.83 FATIGUE, UNSPECIFIED TYPE: ICD-10-CM

## 2021-08-04 LAB
FERRITIN SERPL-MCNC: 33 NG/ML (ref 12–150)
HGB BLD-MCNC: 13 G/DL (ref 11.7–15.7)
IRON SATN MFR SERPL: 17 % (ref 15–46)
IRON SERPL-MCNC: 69 UG/DL (ref 35–180)
TIBC SERPL-MCNC: 395 UG/DL (ref 240–430)

## 2021-08-04 PROCEDURE — 83550 IRON BINDING TEST: CPT | Performed by: PATHOLOGY

## 2021-08-04 PROCEDURE — 85018 HEMOGLOBIN: CPT | Performed by: PATHOLOGY

## 2021-08-04 PROCEDURE — 82728 ASSAY OF FERRITIN: CPT | Performed by: PATHOLOGY

## 2021-08-04 PROCEDURE — 36415 COLL VENOUS BLD VENIPUNCTURE: CPT | Performed by: PATHOLOGY

## 2022-01-18 ENCOUNTER — DOCUMENTATION ONLY (OUTPATIENT)
Dept: FAMILY MEDICINE | Facility: CLINIC | Age: 22
End: 2022-01-18
Payer: COMMERCIAL

## 2022-01-18 NOTE — PROGRESS NOTES
"[facility] ATHLETIC MEDICINE  [facility]   Sport Psychology Intake Note      Location of Visit: [facility] Athletic Department  Date of Visit: 1/18/2022  Duration of Session: 45-50 minutes  Referred by: self    Leland Gonzáles presented on time for initial telehealth/videoconference. Leland Gonzáles was located at the following address for the appointment: [address] [place] [id number]     Emergency contacts provided:  In Person and General: Padmini Gonzáles - Mother - [phone]     The risks and benefits of telehealth and what to do if there is a break in the connection were reviewed. Physical environment/space was confirmed to be secure and private on both ends. The session was both audio and visual on [employer], a secure system that is HIPAA compliant/certified. The telehealth consent form was signed prior to the session; see signed form in chart. The nature and limits of confidentiality, were discussed and Leland Gonzáles stated understanding and consent.      Leland Gonzáles is a 21 year old, White, cisgender, heterosexual American female.  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 She is not an international student.     Self-reported Concerns/Symptoms:  Communication problems  Difficulties with teammates  Dissatisfaction/unhappiness  Loneliness    Presenting Concern:  Client reported she signed up for sport psychology due to roommate and teammate concerns (e.g. felt bullied and \"pushed away\" by certain teammates). She reported since signing up last week, she was able to set boundaries, \"cut ties with negative teammates\", and speak up for herself. The client reported some of her concerns stemmed from freshman year and \"drama\" type dynamics with others on the team.    Suicide and Risk Assessment:  Recent suicidal thoughts: No  Past suicidal thoughts: No  Recent homicidal thoughts: No  Any attempts in the past: No  Any " family/friends/loved ones die by suicide: No  Plan or considering various methods: No  Access to guns: No  Protective factors: no h/o suicide attempt, no plan or intent, no h/o risky impulsive behavior, no access to lethal means, none to minimal alcohol use , commitment to family, good social support   and stable housing  Verbal contract for safety: Yes    Leland denies current urges to self-harm, homicidal ideation, suicidal ideation, means, plans, or intent.    Mental Status & Observations:  Leland appeared generally alert and oriented. Dress was appropriate to the weather and occasion. Grooming and hygiene were appropriate. Eye contact was good. Speech was of normal volume and normal. Thought processes were relevant, logical and goal-directed. Thought content was within normal limits with no evidence of psychotic or paranoid features. Memory appeared intact. She exhibited normal motor activity during the appointment. Mood was appropriate with congruent affect. Insight and judgment appeared age appropriate with good focus in session.  Behavior was cooperative and open    Family Background:  Client reported a good relationship with her parents and younger brother. She stated she is from [place], [place] and stated she tries to go home on weekends and connects with her parents daily. When queried, the client reported her mom's sister struggles with alcohol use concerns.    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 She reported she enjoys campus and denied academic concerns.    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 She reported she feels like she is in a good place mentally with her athletics and denied concerns with physical or mental training.    History of medical and mental health concerns:  Concussion: No  Current/past sports injury: No  Nutrition/eating/appetite: No  Body image: No  Sleep: No    Substance use (alcohol, caffeine, tobacco, cannabis, other): No  Family history of substance abuse: Yes: client reported mother's older sister has alcohol problems  Medications/vitamins/supplements: iron  Concentration/focus/ADHD: No  Caffeine use: No  PTSD/trauma/abuse: No  Significant loss: No  Known history of mental health in self: No  History of therapy or prescribed medications: No  Known history of mental health in family member(s): No  Legal issues: No  Financial concerns: No   Receives partial scholarship  Marlen/Hobbies/Personality:    Identifies as Taoist       Coping skills, strengths and supports:   Coping skills, Family support and Motivation for change    Goals for counseling:  Communication with others, support for dealing with roommate dynamics    Clinical impressions or Other:  Client is a bright young woman struggling with roommate dynamics and navigating difficult conversations around setting boundaries and advocating for her self-care needs. The client is open to sport psych sessions to have a space of support and develop communication skills to navigate these dynamics. She was provided information on the scope of services and recommendations for her treatment goals. She was amenable to following up with this provider and the treatment plan.    Therapy objectives/goals:  Increase assertiveness  Improve communication skills  Provide support    Therapy follow-up plan:  Individual counseling sessions as needed      Janes Mix, PhD LP

## 2022-02-03 ENCOUNTER — DOCUMENTATION ONLY (OUTPATIENT)
Dept: FAMILY MEDICINE | Facility: CLINIC | Age: 22
End: 2022-02-03
Payer: COMMERCIAL

## 2022-02-04 NOTE — PROGRESS NOTES
[facility] ATHLETIC MEDICINE  [facility]   Sport Psychology Progress Note      Location of Visit: [facility] Athletic Department  Date of Visit: 2/3/2022  Duration of Session: 45-50 minutes  Leland Gonzáles presented on time for initial telehealth/videoconference. Leland Gonzáles was located at the following address for the appointment: [address] [place] [id number]     Emergency contacts provided:  In Person and General: Padmini Gonzáles - Mother - [phone]     The risks and benefits of telehealth and what to do if there is a break in the connection were reviewed. Physical environment/space was confirmed to be secure and private on both ends. The session was both audio and visual on Simple [employer], a secure system that is HIPAA compliant/certified. The telehealth consent form was signed prior to the session; see signed form in chart. The nature and limits of confidentiality, were discussed and Leland Gonzáles stated understanding and consent.    Suicide Assessment:  Client denies current urges to self-harm, suicidal ideation, homicidal ideation, plan, intent, or means.    Mental Status & Observations:  Leland appeared generally alert and oriented. Dress was appropriate to the weather and occasion. Grooming and hygiene were appropriate. Eye contact was good. Speech was of normal volume and normal. Mood was appropriate with congruent affect. Thought processes were relevant, logical and goal-directed. Thought content was within normal limits with no evidence of psychotic or paranoid features. Memory appeared intact. Insight and judgment appeared age appropriate with good focus in session.  She exhibited normal motor activity during the appointment.  Behavior was cooperative.      Observations and response to counseling:  Client was open, attentive, and engaged throughout. Client expressed sense of motivation, relief, and excitement with focus and progress with self-confidence  related to performance. Client was engaged with introduction to refining imagery skills and found PMR strategies for recognizing how her body feels helpful.     Intervention:  Time spent building rapport. Client expressed gratitude and sense of excitement with performance and sense of confidence with last meet. Client reported a desire to work on preparations for [employer] and continuing her focus with her sense of confidence in self and progress. Client was introduced to imagery, as she reported success with visualization but also recognizing tension in her shoulders when she visualizes races. The client stated she found seeing and feeling the water and races vivid for her and noticed tension in her shoulders. We introduced tense/relax as a strategy to regulate her body before meets; the client stated she knows she does well when she is warmed up and relaxed in her body. The client was amenable to using visual cues to remind her of what she is feeling both physically and emotionally to help build her awareness as she prepares for her next meet.    Goals for counseling:  Teammate dynamics, communication with others, sport performance    Therapy objectives/goals:  Decrease perceived stress  Increase assertiveness  Improve sport performance    Therapy follow-up plan:  Individual counseling sessions as needed      Janes Mix, PhD LP

## 2022-05-03 ENCOUNTER — DOCUMENTATION ONLY (OUTPATIENT)
Dept: FAMILY MEDICINE | Facility: CLINIC | Age: 22
End: 2022-05-03
Payer: COMMERCIAL

## 2022-05-03 NOTE — PROGRESS NOTES
[facility] ATHLETICS  [facility] initial assessment note  Date of service performed: 5/3/22    Concern: Acute injury  Body part: Shoulder  Description: Right  Injury: Strain  Type: Athletics related  Date of injury: Sx reported 5/3/22    S: pt c/o R shoulder p!, described as  sharp , only P! When swimming, specifically with freestyle stroke.  Worse with longer workouts.  Has felt this p! Before, usually goes away after a few days     Pt reported they Iced r shoulder last night and that after icing, it felt better this morning. No use of NSAIDs needed.     O: TTP over Supraspinatus.  No Bony Tenderness.  Pt denies N/T, no radiating sx.  Mild upper trap elevation on R side, supraspinatus muscle palpably tight.  Pt agrees, right side of neck has felt  stiff and tight .      ROM    Active ROM - all motions pain free, appear equal bilat      Passive ROM - all motions pain free     L ER = 119  R ER = 121    L IR = 82  R IR = 67    L F = 185  R F = 190     Scapular Movement w OH Flexion - Normal   Scapular Movement w Abduction - Normal    Strength 5/5 for all motions, including  strength as compared bilat.     Beightons = 7    Special Tests:    [-] Apprehension   [-] Hutton   [-] Obriens   [-] Drop    [-] Neers   [-] Speeds   [-] TOS exam    Vascular - distal pulse intact, Cap refill WNL    Neuro - No deficits noted.  No radiating sx.    A: Acute inflammation of the supraspinatus tendon     P: Graston over Supraspinatus muscle belly and tendon.  Pt instructed to f/u with ATC if sx do not resolve. Continue to ice after practice, participate in proper warm up w pec stretches.  No training restrictions, ok to participate as tolerated.     Emilia Stout, ATC

## 2022-07-18 ENCOUNTER — DOCUMENTATION ONLY (OUTPATIENT)
Dept: FAMILY MEDICINE | Facility: CLINIC | Age: 22
End: 2022-07-18

## 2022-07-19 ENCOUNTER — OFFICE VISIT (OUTPATIENT)
Dept: FAMILY MEDICINE | Facility: CLINIC | Age: 22
End: 2022-07-19
Payer: COMMERCIAL

## 2022-07-19 ENCOUNTER — DOCUMENTATION ONLY (OUTPATIENT)
Dept: FAMILY MEDICINE | Facility: CLINIC | Age: 22
End: 2022-07-19

## 2022-07-19 VITALS
BODY MASS INDEX: 21.48 KG/M2 | DIASTOLIC BLOOD PRESSURE: 79 MMHG | SYSTOLIC BLOOD PRESSURE: 113 MMHG | WEIGHT: 145 LBS | HEIGHT: 69 IN | HEART RATE: 77 BPM

## 2022-07-19 DIAGNOSIS — F41.8 DEPRESSION WITH ANXIETY: Primary | ICD-10-CM

## 2022-07-19 RX ORDER — HYDROXYZINE HYDROCHLORIDE 25 MG/1
25 TABLET, FILM COATED ORAL
Qty: 30 TABLET | Refills: 0 | Status: SHIPPED | OUTPATIENT
Start: 2022-07-19 | End: 2023-01-17

## 2022-07-19 ASSESSMENT — PATIENT HEALTH QUESTIONNAIRE - PHQ9: SUM OF ALL RESPONSES TO PHQ QUESTIONS 1-9: 15

## 2022-07-19 NOTE — LETTER
Date:July 25, 2022      Patient was self referred, no letter generated. Do not send.        [facility] Health Information

## 2022-07-19 NOTE — PROGRESS NOTES
"[facility] ATHLETIC MEDICINE  [facility]   Sport Psychology Progress Note      Location of Visit: [facility] Athletic Department  Date of Visit: 7/19/2022  Duration of Session: 45-50 minutes  The nature and limits of confidentiality, were discussed and Leland Gonzáles stated understanding and consent.    Suicide Assessment:  Client denies current urges to self-harm, suicidal ideation, homicidal ideation, plan, intent, or means.    Mental Status & Observations:  Leland appeared generally alert and oriented. Dress was appropriate to the weather and occasion. Grooming and hygiene were appropriate. Eye contact was good. Speech was of normal volume and normal. Mood was appropriate with congruent affect. Thought processes were relevant, logical and goal-directed. Thought content was within normal limits with no evidence of psychotic or paranoid features. Memory appeared intact. Insight and judgment appeared age appropriate with good focus in session.  She exhibited normal motor activity during the appointment.  Behavior was cooperative.      Observations and response to counseling:  Client was open, attentive, and engaged throughout. Client expressed sense of performance anxiety related to crying and \"overwhelming nervousness\" at recent meets.     Intervention:  Time spent reconnecting since her last appointment February 2022. Client reported reaching out to sport psych to address recent issues at summer meets. Client reported she tends to get nervous at meets and tear up as a result. She stated this past weekend was difficult and she felt like she wasn't able to control her tears and would have to leave the pool deck multiple times to try to stop crying. Time was spent validating her concerns, noting she feels confident at practice and meets, does not feel nervous at practices, and works hard to be at her best for meets. Time was spent processing her mindset at practice and at meets. On a " "0-100 scale, the client stated she feels present at practices and her \"in the zone\" mindset is 80-90 at practice but a 10 at her most recent meet. The client reported her mind is usually on the future (what ifs, possible outcomes) and then feels pressure, wants to \"get things over with\" and does not enjoy the meet. We began to process the idea of \"just have fun\" and finding what she truly enjoys with meets (e.g. performance, being in the water, spending time with others). The client stated she tends to feel better when she is in the water and feels wet before a heat starts. We discussed the intentionality of these actions and focusing on her controllables and treatment goals around building her mindfulness and being present.    Goals for counseling:  Performance anxiety skills; mindfulness; present-moment strategies    Therapy objectives/goals:  Decrease perceived stress  Increase assertiveness  Improve sport performance    Therapy follow-up plan:  Individual counseling sessions as needed      Janes Mix, PhD LP  "

## 2022-07-19 NOTE — PROGRESS NOTES
[facility] ATHLETICS  [facility] initial assessment note  Date of service performed: 7/18/2022    Concern: Mental health  Body part: N/A  Description: N/A  Injury: N/A  Type: Non-athletics related  Date of injury: Sx Reported 7/18/2022    S: Pt reported to [facility] via text today that she would like to meet with Dr Marrero again in Sport Psychology.  She expressed uncontrollable sadness, feeling tearful and depressed recently.      O: Pt has been visiting with Dr Marrero on and off over the last year, has not met with a physician yet regarding MH.    A: Depression; mental health concern.     P: Pt will meet with Dr Fong on Tuesday 7/19/2022 and will also visit in person with Dr Marrero after the physician appointment in [facility].     Emilia Stout ATC

## 2022-07-19 NOTE — LETTER
7/19/2022      RE: Leland Gonzáles  [address]  [place] [address]     Dear Colleague,    Thank you for referring your patient, Leland Gonzáles, to the [facility] STUDENT ATHLETIC CLINIC. Please see a copy of my visit note below.    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     -Harder to breathe when she feels sad  -Sad and eyes tearing up at meets.   -Symptoms all of last year but is worsening  -Will be excited a few weeks ahead but at the meet feels really sad for unknown reasons  -Has this with tests in school and presentations  -Heart racing and stomach issues too  -Hard to fall asleep before, during and after the meets  -Puts a lot of pressure on herself  -No dx of JENNIFER/depression  -Mom dx with JENNIFER, unsure if she is on meds  -Parents are supportive   -Not socially isolating  -Some counseling in HS.  Symptoms, but to a much lesser degree, since HS.    -Denies SI, HI or h/o of self harm.   -Open to medication and counseling.   -Saw Dr. Janes Pollack for Psychological counseling previously and has an appt today.          Meds; Apri OCP  Ferrous sulfate       O:   Gen:  Well nourished and in no acute distress  HEENT: Extraocular movement intact.  Neck: supple  CV: RRR. No murmurs, rubs, or gallops  Resp: Lungs CTA. NO evidence of consolidation, wheezing, rales, or crackles.   Skin: No rash, erythema, ecchymosis or obvious abnormality  Psych: Euthymic, appropriate mood and affect, memory intact, judgement good. Able to smile easily. Denies SI/HI.   Neuro: Alert and oriented.    A/P:    1. Depression with anxiety  Hx anxiety-prominent sxs since adolescence with brief therapy in high school, appears exacerbated in the setting of high stress environments. Sxs present both in competition and in work/school settings. Anxiety is not affecting functioning. She has had 3-4 sessions with sports psych.   - continue therapy   - start zoloft 50mg every day, hydroxyzine 25mg at bedtime prn   - follow up in 3  weeks  - sertraline (ZOLOFT) 50 MG tablet; Take 1 tablet (50 mg) by mouth daily  Dispense: 31 tablet; Refill: 1  - hydrOXYzine (ATARAX) 25 MG tablet; Take 1 tablet (25 mg) by mouth nightly as needed for itching  Dispense: 30 tablet; Refill: 0  -Risks and benefits of these medications were discussed at length.      The patient was seen by and discussed with attending physician Dr.Suzanne TAVON Fong MD, CAQ, CCD, who agrees with the plan as stated unless otherwise stated.    Emilia Stout [facility] was present for the entire appt.      Rodriguez Moran DO  Primary Care Sports Medicine Fellow          Attending Note:   I have  examined this patient and have reviewed the clinical presentation and progress note with the fellow. I agree with the treatment plan as outlined. The plan was formulated with the fellow on the day of the patient's visit.   Latonya Fong MD, CAQ, CCD  [facility]  Sports Medicine and Bone Health      Again, thank you for allowing me to participate in the care of your patient.      Sincerely,    Latonya Fong MD

## 2022-07-19 NOTE — PROGRESS NOTES
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     -Harder to breathe when she feels sad  -Sad and eyes tearing up at meets.   -Symptoms all of last year but is worsening  -Will be excited a few weeks ahead but at the meet feels really sad for unknown reasons  -Has this with tests in school and presentations  -Heart racing and stomach issues too  -Hard to fall asleep before, during and after the meets  -Puts a lot of pressure on herself  -No dx of JENNIFER/depression  -Mom dx with JENNIFER, unsure if she is on meds  -Parents are supportive   -Not socially isolating  -Some counseling in HS.  Symptoms, but to a much lesser degree, since HS.    -Denies SI, HI or h/o of self harm.   -Open to medication and counseling.   -Saw Dr. Janes Pollack for Psychological counseling previously and has an appt today.          Meds; Apri OCP  Ferrous sulfate       O:   Gen:  Well nourished and in no acute distress  HEENT: Extraocular movement intact.  Neck: supple  CV: RRR. No murmurs, rubs, or gallops  Resp: Lungs CTA. NO evidence of consolidation, wheezing, rales, or crackles.   Skin: No rash, erythema, ecchymosis or obvious abnormality  Psych: Euthymic, appropriate mood and affect, memory intact, judgement good. Able to smile easily. Denies SI/HI.   Neuro: Alert and oriented.    A/P:    1. Depression with anxiety  Hx anxiety-prominent sxs since adolescence with brief therapy in high school, appears exacerbated in the setting of high stress environments. Sxs present both in competition and in work/school settings. Anxiety is not affecting functioning. She has had 3-4 sessions with sports psych.   - continue therapy   - start zoloft 50mg every day, hydroxyzine 25mg at bedtime prn   - follow up in 3 weeks  - sertraline (ZOLOFT) 50 MG tablet; Take 1 tablet (50 mg) by mouth daily  Dispense: 31 tablet; Refill: 1  - hydrOXYzine (ATARAX) 25 MG tablet; Take 1 tablet (25 mg) by mouth nightly as needed for itching  Dispense: 30 tablet;  Refill: 0  -Risks and benefits of these medications were discussed at length.      The patient was seen by and discussed with attending physician Dr.Suzanne TAVON Fong MD, CAQ, CCD, who agrees with the plan as stated unless otherwise stated.    Emilia Stout ATC, [facility] was present for the entire appt.      Rodriguez Moran DO  Primary Care Sports Medicine Fellow

## 2022-07-24 NOTE — PROGRESS NOTES
Attending Note:   I have  examined this patient and have reviewed the clinical presentation and progress note with the fellow. I agree with the treatment plan as outlined. The plan was formulated with the fellow on the day of the patient's visit.   Latonya Fong MD, CAQ, CCD  [facility]  Sports Medicine and Bone Health

## 2022-08-23 ENCOUNTER — DOCUMENTATION ONLY (OUTPATIENT)
Dept: FAMILY MEDICINE | Facility: CLINIC | Age: 22
End: 2022-08-23

## 2022-08-23 ENCOUNTER — OFFICE VISIT (OUTPATIENT)
Dept: FAMILY MEDICINE | Facility: CLINIC | Age: 22
End: 2022-08-23
Payer: COMMERCIAL

## 2022-08-23 VITALS
BODY MASS INDEX: 21.02 KG/M2 | WEIGHT: 141.9 LBS | HEIGHT: 69 IN | DIASTOLIC BLOOD PRESSURE: 65 MMHG | HEART RATE: 69 BPM | SYSTOLIC BLOOD PRESSURE: 107 MMHG

## 2022-08-23 DIAGNOSIS — F41.8 DEPRESSION WITH ANXIETY: ICD-10-CM

## 2022-08-23 RX ORDER — SERTRALINE HYDROCHLORIDE 100 MG/1
100 TABLET, FILM COATED ORAL DAILY
Qty: 31 TABLET | Refills: 1 | Status: SHIPPED | OUTPATIENT
Start: 2022-08-23 | End: 2022-09-19

## 2022-08-23 ASSESSMENT — ANXIETY QUESTIONNAIRES
7. FEELING AFRAID AS IF SOMETHING AWFUL MIGHT HAPPEN: NOT AT ALL
1. FEELING NERVOUS, ANXIOUS, OR ON EDGE: NOT AT ALL
6. BECOMING EASILY ANNOYED OR IRRITABLE: NOT AT ALL
5. BEING SO RESTLESS THAT IT IS HARD TO SIT STILL: SEVERAL DAYS
IF YOU CHECKED OFF ANY PROBLEMS ON THIS QUESTIONNAIRE, HOW DIFFICULT HAVE THESE PROBLEMS MADE IT FOR YOU TO DO YOUR WORK, TAKE CARE OF THINGS AT HOME, OR GET ALONG WITH OTHER PEOPLE: NOT DIFFICULT AT ALL
2. NOT BEING ABLE TO STOP OR CONTROL WORRYING: NOT AT ALL
GAD7 TOTAL SCORE: 2
3. WORRYING TOO MUCH ABOUT DIFFERENT THINGS: NOT AT ALL
GAD7 TOTAL SCORE: 2

## 2022-08-23 ASSESSMENT — PATIENT HEALTH QUESTIONNAIRE - PHQ9
SUM OF ALL RESPONSES TO PHQ QUESTIONS 1-9: 1
5. POOR APPETITE OR OVEREATING: SEVERAL DAYS

## 2022-08-23 NOTE — LETTER
"  8/23/2022      RE: Leland Gonzáles  [address]  [place] [address]     Dear Colleague,    Thank you for referring your patient, Leland Gonzáles, to the [facility] STUDENT ATHLETIC CLINIC. Please see a copy of my visit note below.    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     -Started Zoloft 50mg q day.  Didn't miss any doses.  Doesn't feel any different.  -Some mild nausea the first couple of days of taking it.   -Saw Dr. Janes Pollack for Psychological counseling previously   -Hasn't been in any stressful situations since swimming much less now.  -Start Sept 12 officially for swim practices but there are volunteer practices starting soon.   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 Has a job set up for when she graduates. More intense than undergraduate.    -Sleeping well  -Hasn't needed the hydroxyzine       Current Outpatient Medications   Medication     desogestrel-ethinyl estradiol (APRI) 0.15-30 MG-MCG tablet     ferrous sulfate dried 160 (50 Fe) MG tablet     hydrOXYzine (ATARAX) 25 MG tablet     sertraline (ZOLOFT) 50 MG tablet     No current facility-administered medications for this visit.        O: /65   Pulse 69   Ht 1.753 m (5' 9\")   Wt 64.4 kg (141 lb 14.4 oz)   LMP 08/01/2022 (Exact Date)   BMI 20.95 kg/m      Gen:  Well nourished and in no acute distress  Psych: Euthymic, appropriate mood and affect, memory intact, judgement good. Able to smile easily. Denies SI/HI.   Neuro: Alert and oriented.     A/P:     1. Depression with anxiety  - continue therapy   - Increase  zoloft to 100 mg q day by alternating 50mg with 100 mg for a week and then increase to 100mg q day if doing well without significant side effects. New script given today. Ok to use hydroxyzine 25mg at bedtime prn   - follow up in 4 weeks        Rodriguez Moran DO, Sports Medicine Fellow saw and examined the patient as well.      Emily Garcia ATC was present for the entire " appt.      Latonya Fong MD, CAQ, FACSM, CCD  [facility]  Sports Medicine and Bone Health  Team Physician;  Athletics        Again, thank you for allowing me to participate in the care of your patient.      Sincerely,    Latonya Fong MD

## 2022-08-23 NOTE — LETTER
Date:August 23, 2022      Patient was self referred, no letter generated. Do not send.        [facility] Health Information

## 2022-08-23 NOTE — PROGRESS NOTES
": 23 yo [employer] swim[employer] presents for f/u of JENNIFER with depressive features.     -Started Zoloft 50mg q day.  Didn't miss any doses.  Doesn't feel any different.  -Some mild nausea the first couple of days of taking it.   -Saw Dr. Janes Pollack for Psychological counseling previously   -Hasn't been in any stressful situations since swimming much less now.  -Start Sept 12 officially for swim practices but there are volunteer practices starting soon.   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 Has a job set up for when she graduates. More intense than undergraduate.    -Sleeping well  -Hasn't needed the hydroxyzine       Current Outpatient Medications   Medication     desogestrel-ethinyl estradiol (APRI) 0.15-30 MG-MCG tablet     ferrous sulfate dried 160 (50 Fe) MG tablet     hydrOXYzine (ATARAX) 25 MG tablet     sertraline (ZOLOFT) 50 MG tablet     No current facility-administered medications for this visit.        O: /65   Pulse 69   Ht 1.753 m (5' 9\")   Wt 64.4 kg (141 lb 14.4 oz)   LMP 08/01/2022 (Exact Date)   BMI 20.95 kg/m      Gen:  Well nourished and in no acute distress  Psych: Euthymic, appropriate mood and affect, memory intact, judgement good. Able to smile easily. Denies SI/HI.   Neuro: Alert and oriented.     A/P:     1. Depression with anxiety  - continue therapy   - Increase  zoloft to 100 mg q day by alternating 50mg with 100 mg for a week and then increase to 100mg q day if doing well without significant side effects. New script given today. Ok to use hydroxyzine 25mg at bedtime prn   - follow up in 4 weeks        Rodriguez Moran DO, Sports Medicine Fellow saw and examined the patient as well.      Emily Garcia ATC was present for the entire appt.      Latonya Fong MD, CAQ, FACSM, CCD  [facility]  Sports Medicine and Bone Health  Team Physician;  Athletics    "

## 2022-08-24 NOTE — PROGRESS NOTES
"[facility] ATHLETIC MEDICINE  [facility]   Sport Psychology Progress Note      Location of Visit: [facility] Athletic Department  Date of Visit: 8/23/2022  Duration of Session: 45-50 minutes  The nature and limits of confidentiality, were discussed and Leland Gonzáles stated understanding and consent.    Suicide Assessment:  Client denies current urges to self-harm, suicidal ideation, homicidal ideation, plan, intent, or means.    Mental Status & Observations:  Leland appeared generally alert and oriented. Dress was appropriate to the weather and occasion. Grooming and hygiene were appropriate. Eye contact was good. Speech was of normal volume and normal. Mood was appropriate with congruent affect. Thought processes were relevant, logical and goal-directed. Thought content was within normal limits with no evidence of psychotic or paranoid features. Memory appeared intact. Insight and judgment appeared age appropriate with good focus in session.  She exhibited normal motor activity during the appointment.  Behavior was cooperative.      Observations and response to counseling:  Client was open, attentive, and engaged throughout. Client expressed sense of self-care visiting [place] the last few weeks and feeling like being outside of the swim ([facility]) environment was helpful. Client was engaged with ACT Matrix activity and stated she found the visual and topic on values helpful.    Intervention:  Time spent processing the client's past few weeks. The client stated she seemed to notice it was helpful to be away from her swim environment while still loving the sport. She stated she struggles with \"leaving practice at the pool\" due to thinking about sleep, homework, and time with friends as impacting her swimming career. We spent time discussing intention, action, and environment.    The session segued to introducing the ACT Matrix, addressing internal/external experiences and moving towards " "values and away from negative thoughts/emotions. The client stated she noticed the \"cycle\" of trying to avoid anxiety/worry not working but feeling grateful when she does things to work towards her relationships with others. When queried, the client stated she was unsure how to define some of her values but was open to exploring her core values and discussing next session.    Goals for counseling:  Performance anxiety skills; mindfulness; present-moment strategies    Therapy objectives/goals:  Decrease perceived stress  Increase assertiveness  Improve sport performance    Therapy follow-up plan:  Individual counseling sessions as needed      Janes Mix, PhD LP  "

## 2022-09-13 ENCOUNTER — DOCUMENTATION ONLY (OUTPATIENT)
Dept: FAMILY MEDICINE | Facility: CLINIC | Age: 22
End: 2022-09-13

## 2022-09-13 NOTE — PROGRESS NOTES
"[facility] ATHLETIC MEDICINE  [facility]   Sport Psychology Progress Note      Location of Visit: [facility] Athletic Department  Date of Visit: 9/13/2022  Duration of Session: 45-50 minutes    Suicide Assessment:  Client denies current urges to self-harm, suicidal ideation, homicidal ideation, plan, intent, or means.    Mental Status & Observations:  Leland appeared generally alert and oriented. Dress was appropriate to the weather and occasion. Grooming and hygiene were appropriate. Eye contact was good. Speech was of normal volume and normal. Mood was appropriate with congruent affect. Thought processes were relevant, logical and goal-directed. Thought content was within normal limits with no evidence of psychotic or paranoid features. Memory appeared intact. Insight and judgment appeared age appropriate with good focus in session.  She exhibited normal motor activity during the appointment.  Behavior was cooperative.      Observations and response to counseling:  Client was open, attentive, and engaged throughout. Client reported continued progress aligning with values of family and her relationship, stating she is feeling she is in a good spot right now.    Intervention:  Time spent processing the client's past few weeks, stating she felt initially \"out of shape\" coming back to volunteer practices but then \"surprised\" how her first practice went, stating she received praise from her . Client stated she is not experiencing panic or concerns with swimming right now but also anticipates and worries about the competition season and her experience with panic during meets. The session segued to addressing her values from last session, stating she has become closer with her boyfriend and her family. Client spent time in session identifying and defining her values of \"trust, passion, and vulnerability.\" The client stated trust stood out to her the most as she thought about mainly " connecting with others but wanting to find ways to better trust herself. We discussed her sense of doubt with swimming is due to focus on outcomes rather than the processes in her control that have helped her get where she is currently. Client was encouraged to continue navigating her values and reflect on ways she lives by her top values. Next session will be before first swim meet and process/review her strategies for handling nerves in pressure moments.     Goals for counseling:  Performance anxiety skills; mindfulness; present-moment strategies    Therapy objectives/goals:  Decrease perceived stress  Increase assertiveness  Improve sport performance    Therapy follow-up plan:  Individual counseling sessions as needed      Janes Mix, PhD LP

## 2022-09-19 ENCOUNTER — OFFICE VISIT (OUTPATIENT)
Dept: FAMILY MEDICINE | Facility: CLINIC | Age: 22
End: 2022-09-19
Payer: COMMERCIAL

## 2022-09-19 VITALS
HEIGHT: 69 IN | HEART RATE: 79 BPM | SYSTOLIC BLOOD PRESSURE: 125 MMHG | DIASTOLIC BLOOD PRESSURE: 75 MMHG | WEIGHT: 142.9 LBS | BODY MASS INDEX: 21.17 KG/M2

## 2022-09-19 DIAGNOSIS — F41.8 DEPRESSION WITH ANXIETY: ICD-10-CM

## 2022-09-19 RX ORDER — SERTRALINE HYDROCHLORIDE 100 MG/1
100 TABLET, FILM COATED ORAL DAILY
Qty: 93 TABLET | Refills: 0 | Status: SHIPPED | OUTPATIENT
Start: 2022-09-19 | End: 2023-02-05 | Stop reason: DRUGHIGH

## 2022-09-19 NOTE — LETTER
Date:September 20, 2022      Patient was self referred, no letter generated. Do not send.        [facility] Health Information

## 2022-09-19 NOTE — PROGRESS NOTES
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     -Increased  Zoloft 100 mg q day.  Last 3 weeks on 100 mg after one week of alternating 50/100.    -Feels like she has a steadily good mood now and the medication is helping a lot.   -Seing Dr. Janes Pollack for Psychological counseling every 3 weeks.   -Training is going well.  Intrasquad coming up.  Hopes that she won't have a lot of anxiety.  -Sleep, school (but is early in the semester) and appetite are good.   --Hasn't needed the hydroxyzine at all  -Needs a refill            Current Outpatient Medications   Medication     desogestrel-ethinyl estradiol (APRI) 0.15-30 MG-MCG tablet     ferrous sulfate dried 160 (50 Fe) MG tablet     hydrOXYzine (ATARAX) 25 MG tablet     sertraline (ZOLOFT) 50 MG tablet      No current facility-administered medications for this visit.         O: /75   Pulse 79   Ht 1.742 m (5' 8.6\")   Wt 64.8 kg (142 lb 14.4 oz)   LMP 09/01/2022   BMI 21.35 kg/m       Gen:  Well nourished and in no acute distress  Psych: Euthymic, appropriate mood and affect, memory intact, judgement good. Able to smile easily. Denies SI/HI.   Neuro: Alert and oriented.     A/P:     1. Depression with anxiety  - continue therapy with Dr. Marrero  - Continue increased  zoloft to 100 mg q day New script given today. Ok to use hydroxyzine 25mg at bedtime prn   - follow up in 4-6 weeks or sooner if needed.          Emilia Stout ATC was present for the entire appt.       Ba Wolfe, PG3 EM [facility] was present for the entire appt.      Latonya Fong MD, CAQ, FACSM, CCD  [facility]  Sports Medicine and Bone Health  Team Physician;  Athletics       "

## 2022-09-19 NOTE — LETTER
"  9/19/2022      RE: Leland Gonzáles  [address] [address]     Dear Colleague,    Thank you for referring your patient, Leland Gonzáles, to the [facility]UDENT [facility]. Please see a copy of my visit note below.    21 yo  tung presents for f/u of JENNIFER with depressive features.     -Increased  Zoloft 100 mg q day.  Last 3 weeks on 100 mg after one week of alternating 50/100.    -Feels like she has a steadily good mood now and the medication is helping a lot.   -Seing Dr. Janes Pollack for Psychological counseling every 3 weeks.   -Training is going well.  Intrasquad coming up.  Hopes that she won't have a lot of anxiety.  -Sleep, school (but is early in the semester) and appetite are good.   --Hasn't needed the hydroxyzine at all  -Needs a refill            Current Outpatient Medications   Medication     desogestrel-ethinyl estradiol (APRI) 0.15-30 MG-MCG tablet     ferrous sulfate dried 160 (50 Fe) MG tablet     hydrOXYzine (ATARAX) 25 MG tablet     sertraline (ZOLOFT) 50 MG tablet      No current facility-administered medications for this visit.         O: /75   Pulse 79   Ht 1.742 m (5' 8.6\")   Wt 64.8 kg (142 lb 14.4 oz)   LMP 09/01/2022   BMI 21.35 kg/m       Gen:  Well nourished and in no acute distress  Psych: Euthymic, appropriate mood and affect, memory intact, judgement good. Able to smile easily. Denies SI/HI.   Neuro: Alert and oriented.     A/P:     1. Depression with anxiety  - continue therapy with Dr. Marrero  - Continue increased  zoloft to 100 mg q day New script given today. Ok to use hydroxyzine 25mg at bedtime prn   - follow up in 4-6 weeks or sooner if needed.          Emilia Stout ATC was present for the entire appt.       Ba Wolfe, PG3 EM [facility] was present for the entire appt.      Latonya Fong MD, CAQ, FACSM, CCD  [facility]  Sports Medicine and Bone Health  Team Physician;  Athletics           Again, thank you for allowing me " to participate in the care of your patient.      Sincerely,    Latonya Fong MD

## 2022-10-18 ENCOUNTER — DOCUMENTATION ONLY (OUTPATIENT)
Dept: FAMILY MEDICINE | Facility: CLINIC | Age: 22
End: 2022-10-18

## 2022-10-19 NOTE — PROGRESS NOTES
"[facility] ATHLETIC MEDICINE  [facility]   Sport Psychology Progress Note      Location of Visit: [facility] Athletic Department  Date of Visit: 9/13/2022  Duration of Session: 45-50 minutes    Suicide Assessment:  Client denies current urges to self-harm, suicidal ideation, homicidal ideation, plan, intent, or means.    Mental Status & Observations:  Leland appeared generally alert and oriented. Dress was appropriate to the weather and occasion. Grooming and hygiene were appropriate. Eye contact was good. Speech was of normal volume and normal. Mood was appropriate with congruent affect. Thought processes were relevant, logical and goal-directed. Thought content was within normal limits with no evidence of psychotic or paranoid features. Memory appeared intact. Insight and judgment appeared age appropriate with good focus in session.  She exhibited normal motor activity during the appointment.  Behavior was cooperative.      Observations and response to counseling:  Client was open, attentive, and engaged throughout. Client reported progress and change with her pre-race anxiety, noticing she did not panic and associated her affect and present self to being with her teammates, focusing on swimming and enjoying the moment versus focusing on outcomes.    Intervention:  Time spent processing the client's current concerns noticing her negative thoughts when she hits certain marks in a meet (e.g. 125/200 for her free and 75/100 for her fly). Client stated she feels negative thoughts come up when others catch up to her and she starts to \"slow down and then get beat.\" Gently challenging the client, she reported she \"feels like she is slowing down\" and her split times are not that different. The client was introduced to the SERR model, noticing her automatic response when feeling weak, upset, frustrated and then being introduced to ways she can respond differently (e.g. focus on her kicks off " "the wall, legs, under bernard). The client was initially hesitant to this change due to stating when she has tried to \"think positively or about her catching water\" she stated the negative thoughts are still there. We processed how the \"trained response\" is to focus on her results rather than \"getting rid of negative thoughts.\" The client was then introduced to thought diffusion and provided a script to use outside of practice to have her mind practice leaves on a stream/thought diffusion. The client stated she noticed her mind and self \"feeling lighter\" in the exercise as well as her imagery exercise of negative vs positive thinking.    Goals for counseling:  Performance anxiety skills; mindfulness; present-moment strategies    Therapy objectives/goals:  Decrease perceived stress  Increase assertiveness  Improve sport performance    Therapy follow-up plan:  Individual counseling sessions as needed      Janes Mix, PhD LP  "

## 2022-11-18 ENCOUNTER — DOCUMENTATION ONLY (OUTPATIENT)
Dept: FAMILY MEDICINE | Facility: CLINIC | Age: 22
End: 2022-11-18

## 2022-11-21 ENCOUNTER — OFFICE VISIT (OUTPATIENT)
Dept: FAMILY MEDICINE | Facility: CLINIC | Age: 22
End: 2022-11-21
Payer: COMMERCIAL

## 2022-11-21 VITALS
DIASTOLIC BLOOD PRESSURE: 76 MMHG | HEIGHT: 68 IN | SYSTOLIC BLOOD PRESSURE: 132 MMHG | WEIGHT: 145.8 LBS | HEART RATE: 85 BPM | BODY MASS INDEX: 22.1 KG/M2

## 2022-11-21 DIAGNOSIS — M54.2 NECK PAIN ON RIGHT SIDE: ICD-10-CM

## 2022-11-21 DIAGNOSIS — G89.29 CHRONIC RIGHT SHOULDER PAIN: Primary | ICD-10-CM

## 2022-11-21 DIAGNOSIS — M25.511 CHRONIC RIGHT SHOULDER PAIN: Primary | ICD-10-CM

## 2022-11-21 RX ORDER — KETOROLAC TROMETHAMINE 10 MG/1
10 TABLET, FILM COATED ORAL EVERY 6 HOURS PRN
Qty: 20 TABLET | Refills: 0 | Status: SHIPPED | OUTPATIENT
Start: 2022-11-21

## 2022-11-21 NOTE — PROGRESS NOTES
"[facility] Athletic Medicine Clinic            SUBJECTIVE:     Leland Gonzáles is a 22 year old female  presenting to clinic today .    Right shoulder/Right neck  - shoulder pain during practice chronically while swimming only - known supraspinatus tendonitis that is decently managed  - 3 days ago felt typical shoulder pain while swimming, suddenly shot into neck  - caused her to stop swim, couldn't move arm/neck due to pain  - took Saturday off from swim, biked fine  - swam this morning with snorkel and side breathing - dull pain  - feels it most with shoulder flexed such as in streamline   - has been noticing neck pain 24/7  - has been doing neck isometrics, aleve BID, heating - not helping a ton  - worst with flexion/extension and with right rotation    PMH, Medications and Allergies were reviewed and updated as needed.    ROS:  As noted above otherwise negative.    There is no problem list on file for this patient.      Current Outpatient Medications   Medication Sig Dispense Refill     desogestrel-ethinyl estradiol (APRI) 0.15-30 MG-MCG tablet Take 1 tablet by mouth daily       hydrOXYzine (ATARAX) 25 MG tablet Take 1 tablet (25 mg) by mouth nightly as needed for itching 30 tablet 0     sertraline (ZOLOFT) 100 MG tablet Take 1 tablet (100 mg) by mouth daily 93 tablet 0     ferrous sulfate dried 160 (50 Fe) MG tablet Take 1 tablet (160 mg) by mouth daily (with breakfast) (Patient not taking: Reported on 11/21/2022) 100 tablet 1              OBJECTIVE:     Vitals:   Vitals:    11/21/22 0957   BP: 132/76   Pulse: 85   Weight: 66.1 kg (145 lb 12.8 oz)   Height: 1.727 m (5' 8\")     BMI: Body mass index is 22.17 kg/m .    Gen:  Well nourished and in no acute distress  HEENT: Extraocular movement intact.  Neck: supple  CV: RRR. No murmurs, rubs, or gallops  Resp: Lungs CTA. NO evidence of consolidation, wheezing, rales, or crackles.   Skin: No rash, erythema, ecchymosis or obvious " abnormality  Psych: Euthymic   Neuro: Alert and oriented.    MSK right neck   Inspection: no gross deformities, edema, erythema  Palpation: ttp paraspinal proximal c-spine and SCM, nttp on spinous or transverse processes. Intermittent tenderness along the upper right trapezius  ROM: FROM in all planes with tightness/aching with full flexion/extension as well as left sidebending. Bilateral rotation minimal to no discomfort.  UE pulses 2/4+, reflexes intact C5-7 bilaterally, strength bilateral shoulders, arms, wrists, hand 5/5  Equivocal spurlings with tingling in right 3-5th fingers with left spurlings            ASSESSMENT & PLAN:      Leland was seen today for shoulder right and neck pain.    Diagnoses and all orders for this visit:    Chronic right shoulder pain    Neck pain on right side      22yoF collegiate swimmer with chronic supraspinatus tendinopathy presenting with new right sided neck pain x3 days that is not improving despite rest, ice, isometric lengthening, aleve BID. Exam c/w cervical paraspinal strain and SCM tendonitis, likely inflamed as a compensatory response to chronic RTC pain. Given short timeframe to 5-day meet in 1 week, will trial rx Toradol 10mg QID x5 days and continue working with ATC for supportive therapies. Recommend avoiding exacerbating swims/activities as much as possible over next few days. Follow up next week if not improving otherwise we will touch base after the meet as previously scheduled.    Options for treatment and/or follow-up care were reviewed with the patient and , Emilia Stout. Patient was actively involved in the decision making process. Patient verbalized understanding and was in agreement with the plan.    Alyce Moulton DO  Primary Care Sports Medicine Fellow  Department of Family Medicine and [facility]

## 2022-11-21 NOTE — PROGRESS NOTES
"[facility] ATHLETICS  [facility] ATC follow-up note  Date of service performed: 11/20/2022    Concern/injury: Right Neck & Shoulder    Assessment/plan: ATC f/u with pt today via text:    Pt reports shoulder pain has improved, \"very manageable now\", reports that showering yesterday, OH movement irritated it a little bit.  Pt reports that \"overall, the most pain is still around my neck area, and aleve 2x/day has not been helping enough\"    I have scheduled an appointment with Dr Fong tomorrow @ 10:00 in the [facility] clinic.  Pt is ok to train Monday morning as tolerated, with snorkle to prevent & avoid painful arc of motion in c spine.    Emilia Stout, ATC      "

## 2022-11-21 NOTE — PROGRESS NOTES
"[facility] ATHLETICS  [facility] initial assessment note  Date of service performed: 11/18/2022    Concern: Acute injury  Body part: Neck and Shoulder  Description: Right  Injury: Strain  Type: Athletics related  Date of injury: 11/18/2022    S: Pt c/o R sided neck and shoulder pain while swimming today.  Pt did not complete practice, she tried heating the area on her own, with little success in reducing pain, then reached out to [facility] for evaluation.  Pt has history of RC tendinopathy, and reports the shoulder pain is similar, but more sharp than before.  No known mechanism, other than pain began while swimming, and increased throughout practice.  Neck pain is described as sharp.  Worse when she looks towards the side of pain or straight up. No history of neck injury.  This is her primary breathing side during freestyle stroke. Pt reports the shoulder pain started initially and the neck pain followed shortly after.    O: Pt is not TTP over bony landmarks in the shoulder or neck.  No midline cervical pain.  No gross deformities or discoloration.   R shoulder is TTP over Musculotendinous junction of the supraspinatus and distal tendon attachment, some tenderness in upper upper trap fibers and along upper trap tendons near occiput attachment.     Range of Motion   GH flexion - R shoulder P! > 90 degrees   GH Abduction - R shoulder P! > 90 degrees   GH extension p! Free, WNL   GH Adduction p! Free,WNL     Neck extension p! > 15 degrees   Neck flexion p! @ end range    Neck rotation (to right) p! Throughout motion   Neck rotation (to left) p! At end range, pt describes as sore   Neck lateral flexion (to right) p! Free, WNL, pt describes as mild soreness   Neck later flexion (to left) p! Free, WNL, described as feeling right sided \"tightness\"    Strength   GH Flexion 5/5 isometric, 4+/5 with motion above 90 degrees   GH Extension 5/5 isometric    GH IR 5/5 strength bilat at neutral shoulder positioning.   GH " ER 5/5 strength bilat at neutral shoulder positioning.   GH ADD 5/5 strength bilat   GH ABD 5/5 isometric, 4/5 with motion above 90 degrees       Pt is able to perform neck isometrics in all four planes, p! With extension    Dermatomes & Myotomes   WNL for the upper extremity     Special tests   [+] Quadrant - only with extension and cervical rotation to right side   [-] Vertebral Artery test    [-] Spring test      [+] Empty can   [+] Full Can   [-] Sulcus sign   [-] Xavier   [-] Clunk   [-] Apprehension     A: R shoulder RC strain, I suspect compensation for the initial shoulder pain, led to a pinched nerve in the pt's neck.      P: Light IASTM over supraspinatus, pt has responded well to this in the past.  Heat and gentle soft tissue work over the upper trap fibers and tendon.  Pt was instructed to continue basic neck isometrics and heat today, f/u later this afternoon with ATC to determine practice capability.    Emilia Stout, ATC

## 2022-11-21 NOTE — LETTER
"  11/21/2022      RE: Leland Gonzáles  [address] [facility] [address]     Dear Colleague,    Thank you for referring your patient, Leland Gonzáles, to the [facility]. Please see a copy of my visit note below.    [facility]            SUBJECTIVE:     Leland Gonzáles is a 22 year old female  presenting to clinic today .    Right shoulder/Right neck  - shoulder pain during practice chronically while swimming only - known supraspinatus tendonitis that is decently managed  - 3 days ago felt typical shoulder pain while swimming, suddenly shot into neck  - caused her to stop swim, couldn't move arm/neck due to pain  - took Saturday off from swim, biked fine  - swam this morning with snorkel and side breathing - dull pain  - feels it most with shoulder flexed such as in streamline   - has been noticing neck pain 24/7  - has been doing neck isometrics, aleve BID, heating - not helping a ton  - worst with flexion/extension and with right rotation    PMH, Medications and Allergies were reviewed and updated as needed.    ROS:  As noted above otherwise negative.    There is no problem list on file for this patient.      Current Outpatient Medications   Medication Sig Dispense Refill     desogestrel-ethinyl estradiol (APRI) 0.15-30 MG-MCG tablet Take 1 tablet by mouth daily       hydrOXYzine (ATARAX) 25 MG tablet Take 1 tablet (25 mg) by mouth nightly as needed for itching 30 tablet 0     sertraline (ZOLOFT) 100 MG tablet Take 1 tablet (100 mg) by mouth daily 93 tablet 0     ferrous sulfate dried 160 (50 Fe) MG tablet Take 1 tablet (160 mg) by mouth daily (with breakfast) (Patient not taking: Reported on 11/21/2022) 100 tablet 1              OBJECTIVE:     Vitals:   Vitals:    11/21/22 0957   BP: 132/76   Pulse: 85   Weight: 66.1 kg (145 lb 12.8 oz)   Height: 1.727 m (5' 8\")     BMI: Body mass index is 22.17 kg/m .    Gen:  Well nourished and in no acute " distress  HEENT: Extraocular movement intact.  Neck: supple  CV: RRR. No murmurs, rubs, or gallops  Resp: Lungs CTA. NO evidence of consolidation, wheezing, rales, or crackles.   Skin: No rash, erythema, ecchymosis or obvious abnormality  Psych: Euthymic   Neuro: Alert and oriented.    MSK right neck   Inspection: no gross deformities, edema, erythema  Palpation: ttp paraspinal proximal c-spine and SCM, nttp on spinous or transverse processes. Intermittent tenderness along the upper right trapezius  ROM: FROM in all planes with tightness/aching with full flexion/extension as well as left sidebending. Bilateral rotation minimal to no discomfort.  UE pulses 2/4+, reflexes intact C5-7 bilaterally, strength bilateral shoulders, arms, wrists, hand 5/5  Equivocal spurlings with tingling in right 3-5th fingers with left spurlings            ASSESSMENT & PLAN:      Leland was seen today for shoulder right and neck pain.    Diagnoses and all orders for this visit:    Chronic right shoulder pain    Neck pain on right side      22yoF collegiate swimmer with chronic supraspinatus tendinopathy presenting with new right sided neck pain x3 days that is not improving despite rest, ice, isometric lengthening, aleve BID. Exam c/w cervical paraspinal strain and SCM tendonitis, likely inflamed as a compensatory response to chronic RTC pain. Given short timeframe to 5-day meet in 1 week, will trial rx Toradol 10mg QID x5 days and continue working with ATC for supportive therapies. Recommend avoiding exacerbating swims/activities as much as possible over next few days. Follow up next week if not improving otherwise we will touch base after the meet as previously scheduled.    Options for treatment and/or follow-up care were reviewed with the patient and , Emilia Stout. Patient was actively involved in the decision making process. Patient verbalized understanding and was in agreement with the plan.    Alyce Moulton,  DO  Primary Care Sports Medicine Fellow  Department of Family Medicine and [facility]      Attending Note:   I have examined this patient and have reviewed the clinical presentation and progress note with the fellow. I agree with the treatment plan as outlined. The plan was formulated with the fellow on the day of the patient's visit.   Latonya Fong MD, CAQ, CCD  [facility]  Sports Medicine and Bone Health      Again, thank you for allowing me to participate in the care of your patient.      Sincerely,    Latonya Fong MD

## 2022-11-21 NOTE — PROGRESS NOTES
[facility] ATHLETICS  Maxi ATC follow-up note  Date of service performed: 11/19/2022    Concern/injury: Right Neck & Shoulder     Assessment/plan: Pt reports the KT tape seems to be helping her shoulder pain.  Neck is stiff, but also less painful today.  She was able to complete the 30 min cycling on stationary bike.  She is off tomorrow, I will f/u with her via text on Sunday, to make a plan for Monday morning.     Emilia Stout, ATC

## 2022-11-21 NOTE — LETTER
Date:November 22, 2022      Patient was self referred, no letter generated. Do not send.        [facility] Health Information

## 2022-11-21 NOTE — PROGRESS NOTES
[facility] ATHLETICS  Maxi ATC follow-up note  Date of service performed: 11/18/2022    Concern/injury: Right Shoulder & Neck    Assessment/plan: Pt reports improvement in shoulder symptoms today after this morning's treatment.  She has been instructed to use a snorkle for neutral head positioning.     Pt was unable to swim, got out of water before even initiating warmup.  We did ice and stim over upper traps.  KT taped.  Pt was sent home.  Plan for Saturday morning is to do a 30 min bike ride, no swimming.      Emilia Stout, ATC

## 2022-11-21 NOTE — PROGRESS NOTES
Attending Note:   I have examined this patient and have reviewed the clinical presentation and progress note with the fellow. I agree with the treatment plan as outlined. The plan was formulated with the fellow on the day of the patient's visit.   Latonya Fong MD, CAQ, CCD  [facility]  Sports Medicine and Bone Health

## 2022-12-01 ENCOUNTER — DOCUMENTATION ONLY (OUTPATIENT)
Dept: FAMILY MEDICINE | Facility: CLINIC | Age: 22
End: 2022-12-01

## 2022-12-02 ENCOUNTER — DOCUMENTATION ONLY (OUTPATIENT)
Dept: FAMILY MEDICINE | Facility: CLINIC | Age: 22
End: 2022-12-02

## 2022-12-02 NOTE — PROGRESS NOTES
[facility] ATHLETIC MEDICINE  [facility]   Brief telephone Check-in         Date: 12/1/2022  Duration of Call: 15 minutes  Student-athlete Name: Leland Gonzáles     Provider was connected to client's , Emilia Stout, via telephone who reported concerns with client's sense of panic prior to her evening meet. Client was open to talking with this provider over the phone. Client and provider went through a check-in regarding how they are doing with there sense of anxiety (client reporting she was at a 10 when she first went to the training room to talk to her  and then was at an 8 over the phone. Provider listened and validated their experience, processing and encouraging the client to engage in her coping strategies to ground herself to the moment (deep breathing and 5-sense countdown). Discussed possible challenges with current sense of anxiety, client reporting concerns with her lack of enjoying swimming and feeling scared to share her emotions with others (e.g. teammates and coaches). Client was validated throughout, provider and client discussing her strength of being open and honest, noticing she feels connected when others open up to her. Client denied concerns with SI/HI, stating she felt panic she has not felt since summer. Client stated she was open to continuing to work on her coping skills and was amenable to making a decision about whether or not to compete later in the evening.         Janes Mix, PhD LP

## 2022-12-05 ENCOUNTER — OFFICE VISIT (OUTPATIENT)
Dept: FAMILY MEDICINE | Facility: CLINIC | Age: 22
End: 2022-12-05
Payer: COMMERCIAL

## 2022-12-05 VITALS
WEIGHT: 145 LBS | SYSTOLIC BLOOD PRESSURE: 129 MMHG | HEIGHT: 69 IN | BODY MASS INDEX: 21.48 KG/M2 | DIASTOLIC BLOOD PRESSURE: 85 MMHG | HEART RATE: 83 BPM

## 2022-12-05 DIAGNOSIS — G89.29 CHRONIC RIGHT SHOULDER PAIN: Primary | ICD-10-CM

## 2022-12-05 DIAGNOSIS — M54.2 NECK PAIN ON RIGHT SIDE: ICD-10-CM

## 2022-12-05 DIAGNOSIS — F41.8 DEPRESSION WITH ANXIETY: ICD-10-CM

## 2022-12-05 DIAGNOSIS — M25.511 CHRONIC RIGHT SHOULDER PAIN: Primary | ICD-10-CM

## 2022-12-05 RX ORDER — SERTRALINE HYDROCHLORIDE 100 MG/1
TABLET, FILM COATED ORAL
Qty: 135 TABLET | Refills: 1 | Status: SHIPPED | OUTPATIENT
Start: 2022-12-05

## 2022-12-05 ASSESSMENT — ANXIETY QUESTIONNAIRES
2. NOT BEING ABLE TO STOP OR CONTROL WORRYING: NEARLY EVERY DAY
GAD7 TOTAL SCORE: 16
1. FEELING NERVOUS, ANXIOUS, OR ON EDGE: MORE THAN HALF THE DAYS
6. BECOMING EASILY ANNOYED OR IRRITABLE: MORE THAN HALF THE DAYS
7. FEELING AFRAID AS IF SOMETHING AWFUL MIGHT HAPPEN: MORE THAN HALF THE DAYS
3. WORRYING TOO MUCH ABOUT DIFFERENT THINGS: NEARLY EVERY DAY
5. BEING SO RESTLESS THAT IT IS HARD TO SIT STILL: MORE THAN HALF THE DAYS
GAD7 TOTAL SCORE: 16
IF YOU CHECKED OFF ANY PROBLEMS ON THIS QUESTIONNAIRE, HOW DIFFICULT HAVE THESE PROBLEMS MADE IT FOR YOU TO DO YOUR WORK, TAKE CARE OF THINGS AT HOME, OR GET ALONG WITH OTHER PEOPLE: VERY DIFFICULT

## 2022-12-05 ASSESSMENT — PATIENT HEALTH QUESTIONNAIRE - PHQ9
5. POOR APPETITE OR OVEREATING: MORE THAN HALF THE DAYS
SUM OF ALL RESPONSES TO PHQ QUESTIONS 1-9: 14

## 2022-12-05 NOTE — LETTER
Date:December 6, 2022      Patient was self referred, no letter generated. Do not send.        [facility] Health Information

## 2022-12-05 NOTE — PROGRESS NOTES
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 Depression and anxiety  -Feels like she was doing well on Zoloft 100mg q am.  -Worst panic attack ever last week on Thursday during an important home swim meet.  Unable to swim that evening.  Took -Hydroxyzine 25 mg for the first time that night and slept really well. Able to compete the next two days but still was tired.   Seeing Dr. Marrero q 3 weeks and he also did a virtual appt the evening of her panic attack.   -Denies stress with school and overall doesn't feel stressed out.   -Generally sleeping well since using the Zoloft. She reports never missing a dose.   -Wondering if she should stop the medication or switch to something else.       2.  Chronic supraspinatus tendinitis with neck strain and periscapular myofascial pain exacerbation  -Continues with rehab  -5 day course of Toradol worked well.  She noticed benefits even after only one dose.  No side effects.   -Feels like she is almost back to baseline.  Able to swim full practices.       Meds;  See updated list      O: NAD  /85   Pulse 83   Ht 1.74 m (5' 8.5\")   Wt 65.8 kg (145 lb)   LMP 11/21/2022   BMI 21.73 kg/m      Normal affect  Good eye contact  Appropriately groomed.  Normal thought content  Fidgeting with her hands    Neck:  Mild ttp over the R LS > L LS and upper traps R  R shoulder:  Neg Ruben's testing with good strength  Mildly positive Hawkin's and Neer's testing        A:  JENNIFER  Fatigue may be due to JENNIFER/Depressive symptoms  R shoulder chronic supraspinatus tendinosis  Cervical and periscapular myofascial pain      P:  We had a lengthy discussion regarding increasing the Zoloft to 150mg, stopping Zoloft or switching to an alternative medication.  I have recommended increasing the Zoloft and see how things go over the next 6-8 weeks.  If struggling, consider switching to a SNRI.  She will alternate 100 mg with 150 mg for a week and then increase to 150 mg daily if not " having significant side effects.  Refill given for the new dose. Continue in Psychological counseling with Dr. Marrero.     For her neck and shoulder:  Continue therapy.  Skip some optional practices during finals to lower her overall volume prior to the training trip to [place] in early Jan which will be heavy training.     Emilia Stout ATC for  Swimming and Diving was present for the entire appt.     TOTAL TIME:  35 min  (Chart review 5 min + Appt 20 min + documentation 10 min)    Latonya Fong MD, CAQ, CCD  [facility]  Sports Medicine and Bone Health  Team Physician;  Athletics

## 2022-12-05 NOTE — LETTER
"  12/5/2022      RE: Leland Gonzáles  [address]  [facility] [address]     Dear Colleague,    Thank you for referring your patient, Leland Gonzáles, to the [facility]UD[facility]LET[facility]. Please see a copy of my visit note below.    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 Depression and anxiety  -Feels like she was doing well on Zoloft 100mg q am.  -Worst panic attack ever last week on Thursday during an important home swim meet.  Unable to swim that evening.  Took -Hydroxyzine 25 mg for the first time that night and slept really well. Able to compete the next two days but still was tired.   Seeing Dr. Marrero q 3 weeks and he also did a virtual appt the evening of her panic attack.   -Denies stress with school and overall doesn't feel stressed out.   -Generally sleeping well since using the Zoloft. She reports never missing a dose.   -Wondering if she should stop the medication or switch to something else.       2.  Chronic supraspinatus tendinitis with neck strain and periscapular myofascial pain exacerbation  -Continues with rehab  -5 day course of Toradol worked well.  She noticed benefits even after only one dose.  No side effects.   -Feels like she is almost back to baseline.  Able to swim full practices.       Meds;  See updated list      O: NAD  /85   Pulse 83   Ht 1.74 m (5' 8.5\")   Wt 65.8 kg (145 lb)   LMP 11/21/2022   BMI 21.73 kg/m      Normal affect  Good eye contact  Appropriately groomed.  Normal thought content  Fidgeting with her hands    Neck:  Mild ttp over the R LS > L LS and upper traps R  R shoulder:  Neg Ruben's testing with good strength  Mildly positive Hawkin's and Neer's testing        A:  JENNIFER  Fatigue may be due to JENNIFER/Depressive symptoms  R shoulder chronic supraspinatus tendinosis  Cervical and periscapular myofascial pain      P:  We had a lengthy discussion regarding increasing the Zoloft to 150mg, stopping Zoloft or switching to an alternative " medication.  I have recommended increasing the Zoloft and see how things go over the next 6-8 weeks.  If struggling, consider switching to a SNRI.  She will alternate 100 mg with 150 mg for a week and then increase to 150 mg daily if not having significant side effects.  Refill given for the new dose. Continue in Psychological counseling with Dr. Marrero.     For her neck and shoulder:  Continue therapy.  Skip some optional practices during finals to lower her overall volume prior to the training trip to [place] in early Jan which will be heavy training.     Emilia Stout ATC for  Swimming and Diving was present for the entire appt.     TOTAL TIME:  35 min  (Chart review 5 min + Appt 20 min + documentation 10 min)    Latonya Fong MD, CAQ, CCD  [facility]  Sports Medicine and Bone Health  Team Physician;  Athletics        Again, thank you for allowing me to participate in the care of your patient.      Sincerely,    Latonya Fong MD

## 2022-12-11 ENCOUNTER — DOCUMENTATION ONLY (OUTPATIENT)
Dept: FAMILY MEDICINE | Facility: CLINIC | Age: 22
End: 2022-12-11

## 2022-12-11 NOTE — PROGRESS NOTES
[facility] ATHLETICS    Physical Therapy initial assessment note  Treating therapist: Polo Weiner DPT  Date of service performed: December 8, 2022  Sport: Women's Swim    Concern: Acute injury  Body part: Shoulder  Description: Right  Injury: Strain  Type: Athletics related  Date of injury: 12/5/22    S:  reports she has begun to have sharp right shoulder pain while swimming. This is a new complaint without prior history of shoulder pain or injury.  is left hand dominant. She denies history of dislocation, feelings of apprehension, or trauma. Right shoulder pain is located inferior to the anterior acromion. No pain at rest and denies numbness, tingling, or weakness. Pain increases with raising arm overhead and is worse with swimming.    O:   ROM: Full with pain beginning at 90* flexion and abduction  MMT: Pain with flexion and abduction testing below 90*. Medial border lift off bilaterally (R>L) with resisted testing. Pain is reduced with scapular stabilization with flexion and abduction strength testing.  Pain: 8/10 with swimming  Anni Ruel: (+) on R  Neer's: (+) on R  Load and shift: (-) for apprehension  Bicep's load: (-) for instability  Sulcus sign: (+)  Pain with supine 90/90 ER strength testing. (-) positioning for apprehension. Reduced pain with provider stabilization of anterior GHJ with ER testing.    A:  presents with symptoms consistent with shoulder impingement of the anterior shoulder soft tissue in the subacromial space. Evaluation demonstrates relief of symptoms with overhead exercise when provider provides stabilization of the GHJ and scapula. Rehab program targeting scapular and rotator cuff strengthening and stabilization training with overhead motion reciprocating sport activities.    Exercises:  Scap clocks green theraband  Standing 90/90 IR  Standing 90/90 ER  Quadruped WYI 1lb  *Exercises to go to fatigue and be conducted prior to swim practice    P: SA to be seen  twice prior to winter break and to follow rehab program as a dynamic warm up 3-5x/week. Will follow up with PT following two week training in Jan 2023.    Polo Weiner, PT

## 2022-12-19 ENCOUNTER — DOCUMENTATION ONLY (OUTPATIENT)
Dept: FAMILY MEDICINE | Facility: CLINIC | Age: 22
End: 2022-12-19

## 2022-12-20 NOTE — PROGRESS NOTES
[facility] ATHLETICS    Physical Therapy follow-up note  Treating therapist: Polo Weiner DPT    Date of service performed: December 20, 2022    Concern/injury: Right shoulder pain    Subjective:  reports she was sick last week and had a couple days away from swim practice. She tested negative for flu or COVID. She is feeling much better today and hasn't had any pain with swimming practice across the past couple of days.    Objective:   ER/IR resisted testing: no pain  ER at 90: minimal pain on L  GHJ mobility: Increased anterior and inferior glides compared to L.    Assessment/plan:  is less symptomatic following rest from practice. She has returned to swimming with no symptoms, but does still have pain with overhead strengthening. She demonstrates quick fatigue with dynamic stabilization exercises. Patient will benefit from increased stabilization training of the GHJ to ensure future success in sport.    -Rehab Exercises performed:  90/90 IR 2x40 (Jeffrey 6/10)  90/90 ER 2x20 (Jeffrey 6/10)  Body blade at side of body 2x30 sec  Body blade at 90 2x30 sec  Body blade at 90/90 2x30 sec  90 row to Y 1x10  Isometric 90/90 hold with provider perturbations  90/90 isometric hold with trunk rotation    Plan for next session: progress dynamic stabilization with progression to overhead ranges of motion.    Polo Weiner, PT

## 2023-01-17 ENCOUNTER — OFFICE VISIT (OUTPATIENT)
Dept: FAMILY MEDICINE | Facility: CLINIC | Age: 23
End: 2023-01-17
Payer: COMMERCIAL

## 2023-01-17 VITALS
DIASTOLIC BLOOD PRESSURE: 77 MMHG | HEIGHT: 68 IN | SYSTOLIC BLOOD PRESSURE: 133 MMHG | WEIGHT: 145 LBS | BODY MASS INDEX: 21.98 KG/M2 | HEART RATE: 64 BPM

## 2023-01-17 DIAGNOSIS — F41.8 DEPRESSION WITH ANXIETY: ICD-10-CM

## 2023-01-17 DIAGNOSIS — G89.29 CHRONIC RIGHT SHOULDER PAIN: Primary | ICD-10-CM

## 2023-01-17 DIAGNOSIS — M25.511 CHRONIC RIGHT SHOULDER PAIN: Primary | ICD-10-CM

## 2023-01-17 RX ORDER — HYDROXYZINE HYDROCHLORIDE 25 MG/1
25 TABLET, FILM COATED ORAL
Qty: 30 TABLET | Refills: 1 | Status: SHIPPED | OUTPATIENT
Start: 2023-01-17

## 2023-01-17 NOTE — LETTER
"  1/17/2023      RE: Leland Gonzáles  [address]  [facility] [address]     Dear Colleague,    Thank you for referring your patient, Leland Gonzáles, to the [facility]. Please see a copy of my visit note below.    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 Depression and anxiety  -Doing much better. Training trip to [place] and the meet went well for her without panic attacks.    -She increased the Zoloft 150 mg q day for the last 4 weeks and that is helping a lot.  No problems with the increased dose except for mild nausea when she first increased the dose.    -Using hydroxyzine 25 at night occasionally recently.  Needs a refil.   -Swimming is going really well and her  is giving her lots of compliments.       2.  Chronic supraspinatus tendinitis with neck strain and periscapular myofascial pain exacerbation  -Continues with rehab  -She shoulder is doing well  -Still has some Toradol left from the 20 prescribed. Wondering if if would be ok to use one Toradol for meets?  No side effects or problems with the Toradol.  -Doing full training and competition without restrictions.         Meds;  See updated list        O: NAD  /85   Pulse 83   Ht 1.74 m (5' 8.5\")   Wt 65.8 kg (145 lb)   LMP 11/21/2022   BMI 21.73 kg/m       Normal affect  Good eye contact  Appropriately groomed.  Normal thought content  Fidgeting with her hands           A:  JENNIFER:  Doing well  Fatigue: resolved with better treatment of JENNIFER  R shoulder chronic supraspinatus tendinosis: greatly improved  Cervical and periscapular myofascial pain: improved        P:  Continue  Zoloft to 150mg  Continue in Psychological counseling with Dr. Marrero.   Refill of Hydroxzine given.      For her neck and shoulder:  Continue therapy.  May use one Toradol if needed for meets but will discuss with AT if feeling like she needs to use it.     RTC for her exit evaluation as this is her last season or sooner if " needed.      Emilia Stout, JC for  Swimming and Diving was present for the entire appt.       Alyce Moulton DO, Sports Medicine Fellow saw and examined the patient as well.    Latonya Fong MD, CAQ, CCD  [facility]  Sports Medicine and Bone Health  Team Physician;  Athletics         Again, thank you for allowing me to participate in the care of your patient.      Sincerely,    Latonya Fong MD

## 2023-01-17 NOTE — PROGRESS NOTES
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 Depression and anxiety  -Doing much better. Training trip to [place] and the meet went well for her without panic attacks.    -She increased the Zoloft 150 mg q day for the last 4 weeks and that is helping a lot.  No problems with the increased dose except for mild nausea when she first increased the dose.    -Using hydroxyzine 25 at night occasionally recently.  Needs a refil.   -Swimming is going really well and her  is giving her lots of compliments.       2.  Chronic supraspinatus tendinitis with neck strain and periscapular myofascial pain exacerbation  -Continues with rehab  -She shoulder is doing well  -Still has some Toradol left from the 20 prescribed. Wondering if if would be ok to use one Toradol for meets?  No side effects or problems with the Toradol.  -Doing full training and competition without restrictions.         Meds;  See updated list        O: NAD  /85   Pulse 83   Ht 1.74 m (5' 8.5\")   Wt 65.8 kg (145 lb)   LMP 11/21/2022   BMI 21.73 kg/m       Normal affect  Good eye contact  Appropriately groomed.  Normal thought content  Fidgeting with her hands           A:  JENNIFER:  Doing well  Fatigue: resolved with better treatment of JENNIFER  R shoulder chronic supraspinatus tendinosis: greatly improved  Cervical and periscapular myofascial pain: improved        P:  Continue  Zoloft to 150mg  Continue in Psychological counseling with Dr. Marrero.   Refill of Hydroxzine given.      For her neck and shoulder:  Continue therapy.  May use one Toradol if needed for meets but will discuss with AT if feeling like she needs to use it.     RTC for her exit evaluation as this is her last season or sooner if needed.      Emilia Stout, JC for  Swimming and Diving was present for the entire appt.       Alyce Moulton DO, Sports Medicine Fellow saw and examined the patient as well.    Latonya Fong MD, CAQ, CCD  [facility] [place]  Sports Medicine and Bone Health  Team Physician;  Athletics

## 2023-01-17 NOTE — LETTER
Date:January 18, 2023      Patient was self referred, no letter generated. Do not send.        [facility] Health Information

## 2023-01-31 ENCOUNTER — VIRTUAL VISIT (OUTPATIENT)
Dept: FAMILY MEDICINE | Facility: CLINIC | Age: 23
End: 2023-01-31
Payer: COMMERCIAL

## 2023-01-31 ENCOUNTER — DOCUMENTATION ONLY (OUTPATIENT)
Dept: FAMILY MEDICINE | Facility: CLINIC | Age: 23
End: 2023-01-31

## 2023-01-31 DIAGNOSIS — G89.29 CHRONIC RIGHT SHOULDER PAIN: ICD-10-CM

## 2023-01-31 DIAGNOSIS — R79.0 LOW FERRITIN: ICD-10-CM

## 2023-01-31 DIAGNOSIS — F41.8 DEPRESSION WITH ANXIETY: ICD-10-CM

## 2023-01-31 DIAGNOSIS — M25.511 CHRONIC RIGHT SHOULDER PAIN: ICD-10-CM

## 2023-01-31 DIAGNOSIS — D50.9 IRON DEFICIENCY ANEMIA, UNSPECIFIED IRON DEFICIENCY ANEMIA TYPE: Primary | ICD-10-CM

## 2023-01-31 NOTE — LETTER
Date:February 7, 2023      Patient was self referred, no letter generated. Do not send.        [facility] Health Information

## 2023-01-31 NOTE — PROGRESS NOTES
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  Mental health went well at her most recent competition.   -Saw Dr. Marrero today and he feels like she is doing well.   -Using Zoloft 150mg q day.     2.    Chronic supraspinatus tendinitis with neck strain and periscapular myofascial   -Mild pain  -[place] 2x per week and other rehab helps as well.   -Hurts during practice and competition but not her last meet  -Used intermittent Toradol which was helpful. She has 5 left from the original 20.  [employer] Wed-Sat and [employer] Wed-Sat    3.  H/o low ferritin  -Iron supplementation increased her IBS symptoms  -Tried every other day rather than every day and with and without food but continued to have symptoms.   -Eats red meat      Current Outpatient Medications   Medication     desogestrel-ethinyl estradiol (APRI) 0.15-30 MG-MCG tablet     hydrOXYzine (ATARAX) 25 MG tablet     sertraline (ZOLOFT) 100 MG tablet     ketorolac (TORADOL) 10 MG tablet     No current facility-administered medications for this visit.     O:  NAD via video    A:      1. H/o low ferritin  Side effects to oral iron which the patient discontinued but did take a couple of doses recently and those symptoms returned.    -Check labs  -Trial of ferrous gluconate which is typically better tolerated if her labs indicate that supplementation is necessary.     2.  Chronic supraspinatus tendinitis with neck strain and periscapular myofascial   -Discussed using Toradol 1-2 per day for competitions.  She has two left that are multi-day meets. I have sent a refill for #10 pIlls and she has five remaining from her original prescription.    3.  Mental health  -Doing well  -Continue Zoloft 150 mg qday and Vistaril 25 mg at bedtime if needed.   -Continue Sports Psychology counseling    Alyce Moulton, Sports Medicine Fellow was present for the entire appt.     Emilia Stout ATC was present by video for the entire appt.    Leland is a 22 year old who is  being evaluated via a billable video visit.      How would you like to obtain your AVS? Per ATC  If the video visit is dropped, the invitation should be resent by:ATC   Will anyone else be joining your video visit? No      Video-Visit Details    Type of service:  Video Visit     Originating Location (pt. Location): home  Distant Location (provider location):  [facility]   Platform used for Video Visit: [employer]    Start time:  11:10  Finish time: 11: 36  Total time:  26 min    Latonya Fong MD, CAQ, FACSM, [facility]  Sports Medicine and Bone Health  Team Physician;  Athletics

## 2023-01-31 NOTE — PROGRESS NOTES
"[facility] ATHLETIC MEDICINE  [facility]   Sport Psychology Progress Note      Location of Visit: [facility] Athletic Department  Date of Visit: 1/31/2023  Duration of Session: 45-50 minutes  Leland Gonzáles presented on time for initial telehealth/videoconference. Leland Gonzáles was located at the following address for the appointment: [address] [place] [id number]    Suicide Assessment:  Client denies current urges to self-harm, suicidal ideation, homicidal ideation, plan, intent, or means.    Mental Status & Observations:  Leland appeared generally alert and oriented. Dress was appropriate to the weather and occasion. Grooming and hygiene were appropriate. Eye contact was good. Speech was of normal volume and normal. Mood was appropriate with congruent affect. Thought processes were relevant, logical and goal-directed. Thought content was within normal limits with no evidence of psychotic or paranoid features. Memory appeared intact. Insight and judgment appeared age appropriate with good focus in session.  She exhibited normal motor activity during the appointment.  Behavior was cooperative and open.      Observations and response to counseling:  Client was open, attentive, and engaged throughout. Client reported positive progress with her mindset related to swimming and going into events yet struggling with lacking alignment with performance outcomes.     Intervention:  Client reported doing well mentally since training trip Client reported her \"Mindset good = want to be at practice and training was good.\" Client reported stress is related to recent meet due to having a good overall mindset going in, but not having results she felt she could have gotten (e.g. swam faster in high school). Time was spent processing this seeming misalignment but stated she was open to talking with coaches and doctors about her concerns. Client stated she was told months ago she has low iron " and was encouraged to take supplements but stopped due to IBS. Client reported she will have a meeting with Dr. Fong following sport psych and stated she has received positive advice from coaches around nutrition and other ways to recover physically. Client initially expressed concern with maintaining her mindset going into big 10s but recognized her work of reaching out to support systems, diffusing herself from her negative thoughts, and putting effort towards physical recovery and training helps reduce the likelihood of being mentally unprepared.     Goals for counseling:  Performance anxiety skills; mindfulness; present-moment strategies, process motivation for sport    Therapy objectives/goals:  Decrease perceived stress  Increase assertiveness  Improve sport performance    Therapy follow-up plan:  Individual counseling sessions as needed      Janes Mix, PhD LP

## 2023-01-31 NOTE — LETTER
1/31/2023      RE: Leland Gonzáles  [address]  [place] [address]     Dear Colleague,    Thank you for referring your patient, Leland Gonzáles, to the [facility] STUDENT [facility]IC [facility]. Please see a copy of my visit note below.    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  Mental health went well at her most recent competition.   -Saw Dr. Marrero today and he feels like she is doing well.   -Using Zoloft 150mg q day.     2.    Chronic supraspinatus tendinitis with neck strain and periscapular myofascial   -Mild pain  -[place] 2x per week and other rehab helps as well.   -Hurts during practice and competition but not her last meet  -Used intermittent Toradol which was helpful. She has 5 left from the original 20.  [employer] Wed-Sat and [employer] Wed-Sat    3.  H/o low ferritin  -Iron supplementation increased her IBS symptoms  -Tried every other day rather than every day and with and without food but continued to have symptoms.   -Eats red meat      Current Outpatient Medications   Medication     desogestrel-ethinyl estradiol (APRI) 0.15-30 MG-MCG tablet     hydrOXYzine (ATARAX) 25 MG tablet     sertraline (ZOLOFT) 100 MG tablet     ketorolac (TORADOL) 10 MG tablet     No current facility-administered medications for this visit.     O:  NAD via video    A:      1. H/o low ferritin  Side effects to oral iron which the patient discontinued but did take a couple of doses recently and those symptoms returned.    -Check labs  -Trial of ferrous gluconate which is typically better tolerated if her labs indicate that supplementation is necessary.     2.  Chronic supraspinatus tendinitis with neck strain and periscapular myofascial   -Discussed using Toradol 1-2 per day for competitions.  She has two left that are multi-day meets. I have sent a refill for #10 pIlls and she has five remaining from her original prescription.    3.  Mental health  -Doing well  -Continue Zoloft 150 mg qday and Vistaril 25 mg  at bedtime if needed.   -Continue Sports Psychology counseling    Alyce Moulton, Sports Medicine Fellow was present for the entire appt.     Emilia Stout, ATC was present by video for the entire appt.    Leland is a 22 year old who is being evaluated via a billable video visit.      How would you like to obtain your AVS? Per ATC  If the video visit is dropped, the invitation should be resent by:ATC   Will anyone else be joining your video visit? No      Video-Visit Details    Type of service:  Video Visit     Originating Location (pt. Location): home  Distant Location (provider location):  [facility]   Platform used for Video Visit: [employer]    Start time:  11:10  Finish time: 11: 36  Total time:  26 min    Latonya Fong MD, CAQ, FACSM, [facility]  Sports Medicine and Bone Health  Team Physician;  Athletics          Again, thank you for allowing me to participate in the care of your patient.      Sincerely,    Latonya Fong MD

## 2023-02-01 ENCOUNTER — APPOINTMENT (OUTPATIENT)
Dept: LAB | Facility: CLINIC | Age: 23
End: 2023-02-01
Payer: COMMERCIAL

## 2023-02-01 ENCOUNTER — LAB (OUTPATIENT)
Dept: LAB | Facility: CLINIC | Age: 23
End: 2023-02-01
Payer: COMMERCIAL

## 2023-02-01 DIAGNOSIS — D50.9 IRON DEFICIENCY ANEMIA, UNSPECIFIED IRON DEFICIENCY ANEMIA TYPE: ICD-10-CM

## 2023-02-01 LAB
BASOPHILS # BLD AUTO: 0 10E3/UL (ref 0–0.2)
BASOPHILS NFR BLD AUTO: 1 %
EOSINOPHIL # BLD AUTO: 0.1 10E3/UL (ref 0–0.7)
EOSINOPHIL NFR BLD AUTO: 1 %
ERYTHROCYTE [DISTWIDTH] IN BLOOD BY AUTOMATED COUNT: 12.7 % (ref 10–15)
FERRITIN SERPL-MCNC: 29 NG/ML (ref 6–175)
HCT VFR BLD AUTO: 40.1 % (ref 35–47)
HGB BLD-MCNC: 13.3 G/DL (ref 11.7–15.7)
IMM GRANULOCYTES # BLD: 0 10E3/UL
IMM GRANULOCYTES NFR BLD: 0 %
IRON BINDING CAPACITY (ROCHE): 454 UG/DL (ref 240–430)
IRON SATN MFR SERPL: 33 % (ref 15–46)
IRON SERPL-MCNC: 148 UG/DL (ref 37–145)
LYMPHOCYTES # BLD AUTO: 2.6 10E3/UL (ref 0.8–5.3)
LYMPHOCYTES NFR BLD AUTO: 32 %
MCH RBC QN AUTO: 30.8 PG (ref 26.5–33)
MCHC RBC AUTO-ENTMCNC: 33.2 G/DL (ref 31.5–36.5)
MCV RBC AUTO: 93 FL (ref 78–100)
MONOCYTES # BLD AUTO: 0.5 10E3/UL (ref 0–1.3)
MONOCYTES NFR BLD AUTO: 6 %
NEUTROPHILS # BLD AUTO: 4.9 10E3/UL (ref 1.6–8.3)
NEUTROPHILS NFR BLD AUTO: 60 %
NRBC # BLD AUTO: 0 10E3/UL
NRBC BLD AUTO-RTO: 0 /100
PLATELET # BLD AUTO: 248 10E3/UL (ref 150–450)
RBC # BLD AUTO: 4.32 10E6/UL (ref 3.8–5.2)
WBC # BLD AUTO: 8.1 10E3/UL (ref 4–11)

## 2023-02-01 PROCEDURE — 36415 COLL VENOUS BLD VENIPUNCTURE: CPT | Performed by: PATHOLOGY

## 2023-02-05 RX ORDER — KETOROLAC TROMETHAMINE 10 MG/1
10 TABLET, FILM COATED ORAL EVERY 6 HOURS PRN
Qty: 10 TABLET | Refills: 0 | Status: SHIPPED | OUTPATIENT
Start: 2023-02-05

## 2023-02-27 ENCOUNTER — OFFICE VISIT (OUTPATIENT)
Dept: FAMILY MEDICINE | Facility: CLINIC | Age: 23
End: 2023-02-27
Payer: COMMERCIAL

## 2023-02-27 VITALS
DIASTOLIC BLOOD PRESSURE: 81 MMHG | SYSTOLIC BLOOD PRESSURE: 122 MMHG | HEIGHT: 69 IN | BODY MASS INDEX: 21.77 KG/M2 | WEIGHT: 147 LBS | HEART RATE: 82 BPM

## 2023-02-27 DIAGNOSIS — D50.9 IRON DEFICIENCY ANEMIA, UNSPECIFIED IRON DEFICIENCY ANEMIA TYPE: Primary | ICD-10-CM

## 2023-02-27 DIAGNOSIS — F41.9 ANXIETY: ICD-10-CM

## 2023-02-27 NOTE — LETTER
Date:March 6, 2023      Patient was self referred, no letter generated. Do not send.        [facility] Health Information

## 2023-02-27 NOTE — LETTER
"  2/27/2023      RE: Leland Gonzáles  [address]  [place] [address]     Dear Colleague,    Thank you for referring your patient, Leland Gonzáles, to the [facility]UD[facility]IC [facility]. Please see a copy of my visit note below.    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  Mental health going well.   -Continues to see Dr. Marrero  -Using Zoloft 150mg q day.         3.  H/o low ferritin and recent labs were obtained.   -Iron supplementation increased her IBS symptoms.  -Eats red meat            Current Outpatient Medications   Medication     desogestrel-ethinyl estradiol (APRI) 0.15-30 MG-MCG tablet     hydrOXYzine (ATARAX) 25 MG tablet     sertraline (ZOLOFT) 100 MG tablet     ketorolac (TORADOL) 10 MG tablet      No current facility-administered medications for this visit.      O:  NAD  /81   Pulse 82   Ht 1.753 m (5' 9\")   Wt 66.7 kg (147 lb)   LMP 01/17/2023 (Approximate)   BMI 21.71 kg/m            Latest Reference Range & Units 08/04/21 10:10 02/01/23 09:18   Ferritin 6 - 175 ng/mL 33 29   Iron 37 - 145 ug/dL 69 148 (H)   Iron Binding Cap 240 - 430 ug/dL 395    Iron Binding Capacity 240 - 430 ug/dL  454 (H)   Iron Saturation Index 15 - 46 % 17    Iron Sat Index 15 - 46 %  33   (H): Data is abnormally high     A:       1. H/o low ferritin with a ferritin of 29 and iron sat of 33   -Reviewed her labs.  -Continue eating red meat.  Try to have it 3 times per wk. Reviewed iron sources from leafy green vegetables and to ingest with a Vit C source.  No need for iron supplementation at this time. May consider repeating in 6 months or so with her new PCP.       2.  Mental health  -Doing well  -Continue Zoloft 150 mg qday and Vistaril 25 mg at bedtime if needed.   -Continue Sports Psychology counseling  -Discussed establishing care for mental health after graduation.  She isn't currently certain of her plans/location.  We will refill her medication for a 3 month supply with 1 " refill at the end of the semester to give her time to establish care elsewhere.           Emilia Stout ATC was present by video for the entire appt.        Again, thank you for allowing me to participate in the care of your patient.      Sincerely,    Latonya Fong MD

## 2023-03-06 NOTE — PROGRESS NOTES
"S:  21 yo swimalberto presents for exit evaluation of 2 issues:     1.  Mental health going well.   -Continues to see Dr. Marrero  -Using Zoloft 150mg q day.         3.  H/o low ferritin and recent labs were obtained.   -Iron supplementation increased her IBS symptoms.  -Eats red meat            Current Outpatient Medications   Medication     desogestrel-ethinyl estradiol (APRI) 0.15-30 MG-MCG tablet     hydrOXYzine (ATARAX) 25 MG tablet     sertraline (ZOLOFT) 100 MG tablet     ketorolac (TORADOL) 10 MG tablet      No current facility-administered medications for this visit.      O:  NAD  /81   Pulse 82   Ht 1.753 m (5' 9\")   Wt 66.7 kg (147 lb)   LMP 01/17/2023 (Approximate)   BMI 21.71 kg/m            Latest Reference Range & Units 08/04/21 10:10 02/01/23 09:18   Ferritin 6 - 175 ng/mL 33 29   Iron 37 - 145 ug/dL 69 148 (H)   Iron Binding Cap 240 - 430 ug/dL 395    Iron Binding Capacity 240 - 430 ug/dL  454 (H)   Iron Saturation Index 15 - 46 % 17    Iron Sat Index 15 - 46 %  33   (H): Data is abnormally high     A:       1. H/o low ferritin with a ferritin of 29 and iron sat of 33   -Reviewed her labs.  -Continue eating red meat.  Try to have it 3 times per wk. Reviewed iron sources from leafy green vegetables and to ingest with a Vit C source.  No need for iron supplementation at this time. May consider repeating in 6 months or so with her new PCP.       2.  Mental health  -Doing well  -Continue Zoloft 150 mg qday and Vistaril 25 mg at bedtime if needed.   -Continue Sports Psychology counseling  -Discussed establishing care for mental health after graduation.  She isn't currently certain of her plans/location.  We will refill her medication for a 3 month supply with 1 refill at the end of the semester to give her time to establish care elsewhere.           Emilia Stout ATC was present by video for the entire appt.    "

## 2023-04-16 ENCOUNTER — HEALTH MAINTENANCE LETTER (OUTPATIENT)
Age: 23
End: 2023-04-16

## 2024-06-23 ENCOUNTER — HEALTH MAINTENANCE LETTER (OUTPATIENT)
Age: 24
End: 2024-06-23

## 2025-07-12 ENCOUNTER — HEALTH MAINTENANCE LETTER (OUTPATIENT)
Age: 25
End: 2025-07-12